# Patient Record
Sex: MALE | Race: WHITE | NOT HISPANIC OR LATINO | Employment: FULL TIME | URBAN - METROPOLITAN AREA
[De-identification: names, ages, dates, MRNs, and addresses within clinical notes are randomized per-mention and may not be internally consistent; named-entity substitution may affect disease eponyms.]

---

## 2023-08-14 ENCOUNTER — OFFICE VISIT (OUTPATIENT)
Dept: FAMILY MEDICINE | Facility: CLINIC | Age: 38
End: 2023-08-14
Payer: COMMERCIAL

## 2023-08-14 VITALS
HEIGHT: 73 IN | SYSTOLIC BLOOD PRESSURE: 120 MMHG | TEMPERATURE: 98.2 F | WEIGHT: 181 LBS | HEART RATE: 72 BPM | DIASTOLIC BLOOD PRESSURE: 75 MMHG | BODY MASS INDEX: 23.99 KG/M2 | OXYGEN SATURATION: 100 % | RESPIRATION RATE: 16 BRPM

## 2023-08-14 DIAGNOSIS — E05.90 HYPERTHYROIDISM: ICD-10-CM

## 2023-08-14 DIAGNOSIS — F33.2 SEVERE EPISODE OF RECURRENT MAJOR DEPRESSIVE DISORDER, WITHOUT PSYCHOTIC FEATURES (H): Primary | ICD-10-CM

## 2023-08-14 PROCEDURE — 84443 ASSAY THYROID STIM HORMONE: CPT | Performed by: STUDENT IN AN ORGANIZED HEALTH CARE EDUCATION/TRAINING PROGRAM

## 2023-08-14 PROCEDURE — 99204 OFFICE O/P NEW MOD 45 MIN: CPT | Performed by: STUDENT IN AN ORGANIZED HEALTH CARE EDUCATION/TRAINING PROGRAM

## 2023-08-14 PROCEDURE — 36415 COLL VENOUS BLD VENIPUNCTURE: CPT | Performed by: STUDENT IN AN ORGANIZED HEALTH CARE EDUCATION/TRAINING PROGRAM

## 2023-08-14 RX ORDER — LAMOTRIGINE 100 MG/1
250 TABLET ORAL DAILY
COMMUNITY
End: 2023-09-26

## 2023-08-14 RX ORDER — METHIMAZOLE 5 MG/1
5 TABLET ORAL DAILY
COMMUNITY
End: 2024-04-02

## 2023-08-14 ASSESSMENT — PATIENT HEALTH QUESTIONNAIRE - PHQ9
SUM OF ALL RESPONSES TO PHQ QUESTIONS 1-9: 15
SUM OF ALL RESPONSES TO PHQ QUESTIONS 1-9: 15
10. IF YOU CHECKED OFF ANY PROBLEMS, HOW DIFFICULT HAVE THESE PROBLEMS MADE IT FOR YOU TO DO YOUR WORK, TAKE CARE OF THINGS AT HOME, OR GET ALONG WITH OTHER PEOPLE: VERY DIFFICULT

## 2023-08-14 ASSESSMENT — COLUMBIA-SUICIDE SEVERITY RATING SCALE - C-SSRS
6. HAVE YOU EVER DONE ANYTHING, STARTED TO DO ANYTHING, OR PREPARED TO DO ANYTHING TO END YOUR LIFE?: NO
2. IN THE PAST MONTH, HAVE YOU ACTUALLY HAD ANY THOUGHTS OF KILLING YOURSELF?: NO
1. WITHIN THE PAST MONTH, HAVE YOU WISHED YOU WERE DEAD OR WISHED YOU COULD GO TO SLEEP AND NOT WAKE UP?: YES

## 2023-08-14 ASSESSMENT — ANXIETY QUESTIONNAIRES
2. NOT BEING ABLE TO STOP OR CONTROL WORRYING: NEARLY EVERY DAY
6. BECOMING EASILY ANNOYED OR IRRITABLE: NEARLY EVERY DAY
IF YOU CHECKED OFF ANY PROBLEMS ON THIS QUESTIONNAIRE, HOW DIFFICULT HAVE THESE PROBLEMS MADE IT FOR YOU TO DO YOUR WORK, TAKE CARE OF THINGS AT HOME, OR GET ALONG WITH OTHER PEOPLE: VERY DIFFICULT
4. TROUBLE RELAXING: NEARLY EVERY DAY
GAD7 TOTAL SCORE: 20
5. BEING SO RESTLESS THAT IT IS HARD TO SIT STILL: NEARLY EVERY DAY
3. WORRYING TOO MUCH ABOUT DIFFERENT THINGS: NEARLY EVERY DAY
1. FEELING NERVOUS, ANXIOUS, OR ON EDGE: MORE THAN HALF THE DAYS
7. FEELING AFRAID AS IF SOMETHING AWFUL MIGHT HAPPEN: NEARLY EVERY DAY
GAD7 TOTAL SCORE: 20

## 2023-08-14 NOTE — PROGRESS NOTES
Assessment & Plan     Severe episode of recurrent major depressive disorder, without psychotic features (H)  - continue lamotrigine for now. Briefly discussed potential medications which could be added, will await records because patient reports he had adverse responses to a number of medications.   - NELIDA for his previous provider in [place] ([facility])   - Adult Mental Health  Referral for medication consult   - patient will call his insurance for options for therapists      Hyperthyroidism  Reports last labs were checked in January 2023 and were normal  - TSH with free T4 reflex  - continue methimazole      Depression Screening Follow Up        8/14/2023     2:23 PM   PHQ   PHQ-9 Total Score 15   Q9: Thoughts of better off dead/self-harm past 2 weeks Several days   F/U: Thoughts of suicide or self-harm No   F/U: Safety concerns No         8/14/2023     2:23 PM   Last PHQ-9   1.  Little interest or pleasure in doing things 2   2.  Feeling down, depressed, or hopeless 2   3.  Trouble falling or staying asleep, or sleeping too much 1   4.  Feeling tired or having little energy 1   5.  Poor appetite or overeating 2   6.  Feeling bad about yourself 3   7.  Trouble concentrating 2   8.  Moving slowly or restless 1   Q9: Thoughts of better off dead/self-harm past 2 weeks 1   PHQ-9 Total Score 15   In the past two weeks have you had thoughts of suicide or self harm? No   Do you have concerns about your personal safety or the safety of others? No             8/14/2023     3:08 PM   [facility]-SSRS ([facility] [place])   Within the last month, have you wished you were dead or wished you could go to sleep and not wake up? Yes   Within the last month, have you had any actual thoughts of killing yourself? No   Within the last month, have you ever done anything, started to do anything, or prepared to do anything to end your life? No       Follow Up        Follow Up Actions Taken  Crisis resource information provided in  the After Visit Summary  Mental Health Referral placed      Joi Schumacher PA-C  [facility] GHASSAN Chavez is a 37 year old, presenting for the following health issues:  Establish Care        8/14/2023     2:32 PM   Additional Questions   Roomed by Julia COSTA       History of Present Illness       Mental Health Follow-up:  Patient presents to follow-up on Depression & Anxiety.Patient's depression since last visit has been:  No change  The patient is having other symptoms associated with depression.  Patient's anxiety since last visit has been:  No change  The patient is having other symptoms associated with anxiety.  Any significant life events: job concerns, financial concerns, grief or loss and health concerns  Patient is feeling anxious or having panic attacks.  Patient has concerns about alcohol or drug use.    Hypertension: He presents for follow up of hypertension.  He does not check blood pressure  regularly outside of the clinic. Outside blood pressures have been over 140/90. He does not follow a low salt diet.     Hypothyroidism:     Since last visit, patient describes the following symptoms::  Anxiety, Depression and Fatigue    Reason for visit:  Establish primary care/psychiatric & psychological care/discuss alternative medicines & treatments    He eats 0-1 servings of fruits and vegetables daily.He consumes 0 sweetened beverage(s) daily.He exercises with enough effort to increase his heart rate 20 to 29 minutes per day.  He exercises with enough effort to increase his heart rate 3 or less days per week.   He is taking medications regularly.     {Provider Documentation  Link to [facility]-SSRS ([facility]) Flowsheet :92359      Abnormal Mood Symptoms  Onset: Off and on since age 15  Description:   Depression: YES  Anxiety: YES  Accompanying Signs & Symptoms:  Still participating in activities that you used to enjoy: No  Fatigue: Yes  Irritability: YES  Difficulty concentrating:  "YES  Changes in appetite: no  Problems with sleep: no  Heart racing/beating fast : no  Thoughts of hurting yourself or others: none  History:   Recent stress: YES  Prior depression hospitalization: Yes in early 20s   Family history of depression: YES  Family history of anxiety: YES  Precipitating factors:   Alcohol/drug use: no  Alleviating factors:  Sometimes edibles help that have thc or cbd    Therapies Tried and outcome: lamotrigine 250 mg daily with some benefit - started in 2015    First diagnosed with MDD at age 15. Has previously tried multiple different antidepressant medications (does not recall all of them, but remembers prozac was the first one) and had side effects and/or worsening symptoms. Feels current medication is working OK, but interested in a change. Recently moved from  [place] in June, and would like to  be referred for therapy and psychiatry services.     Review of Systems   As noted above in HPI.        Objective    /75 (BP Location: Right arm, Patient Position: Sitting, Cuff Size: Adult Large)   Pulse 72   Temp 98.2  F (36.8  C) (Oral)   Resp 16   Ht 1.854 m (6' 1\")   Wt 82.1 kg (181 lb)   SpO2 100%   BMI 23.88 kg/m    Body mass index is 23.88 kg/m .  Physical Exam   GENERAL: healthy, alert and no distress  NECK: no adenopathy, no asymmetry, masses, or scars and thyroid normal to palpation  RESP: lungs clear to auscultation - no rales, rhonchi or wheezes  CV: regular rate and rhythm, normal S1 S2, no S3 or S4, no murmur, click or rub, no peripheral edema and peripheral pulses strong  ABDOMEN: soft, nontender, no hepatosplenomegaly, no masses and bowel sounds normal  MS: no gross musculoskeletal defects noted, no edema    Results for orders placed or performed in visit on 08/14/23   TSH with free T4 reflex     Status: Normal   Result Value Ref Range    TSH 2.03 0.30 - 4.20 uIU/mL                   "

## 2023-08-15 LAB — TSH SERPL DL<=0.005 MIU/L-ACNC: 2.03 UIU/ML (ref 0.3–4.2)

## 2023-08-30 ENCOUNTER — MYC MEDICAL ADVICE (OUTPATIENT)
Dept: FAMILY MEDICINE | Facility: CLINIC | Age: 38
End: 2023-08-30
Payer: COMMERCIAL

## 2023-09-19 ASSESSMENT — ANXIETY QUESTIONNAIRES
GAD7 TOTAL SCORE: 19
4. TROUBLE RELAXING: NEARLY EVERY DAY
7. FEELING AFRAID AS IF SOMETHING AWFUL MIGHT HAPPEN: MORE THAN HALF THE DAYS
3. WORRYING TOO MUCH ABOUT DIFFERENT THINGS: NEARLY EVERY DAY
GAD7 TOTAL SCORE: 19
GAD7 TOTAL SCORE: 19
2. NOT BEING ABLE TO STOP OR CONTROL WORRYING: NEARLY EVERY DAY
6. BECOMING EASILY ANNOYED OR IRRITABLE: NEARLY EVERY DAY
1. FEELING NERVOUS, ANXIOUS, OR ON EDGE: MORE THAN HALF THE DAYS
3. WORRYING TOO MUCH ABOUT DIFFERENT THINGS: NEARLY EVERY DAY
1. FEELING NERVOUS, ANXIOUS, OR ON EDGE: MORE THAN HALF THE DAYS
2. NOT BEING ABLE TO STOP OR CONTROL WORRYING: NEARLY EVERY DAY
7. FEELING AFRAID AS IF SOMETHING AWFUL MIGHT HAPPEN: MORE THAN HALF THE DAYS
4. TROUBLE RELAXING: NEARLY EVERY DAY
5. BEING SO RESTLESS THAT IT IS HARD TO SIT STILL: NEARLY EVERY DAY
5. BEING SO RESTLESS THAT IT IS HARD TO SIT STILL: NEARLY EVERY DAY
6. BECOMING EASILY ANNOYED OR IRRITABLE: NEARLY EVERY DAY
GAD7 TOTAL SCORE: 19
IF YOU CHECKED OFF ANY PROBLEMS ON THIS QUESTIONNAIRE, HOW DIFFICULT HAVE THESE PROBLEMS MADE IT FOR YOU TO DO YOUR WORK, TAKE CARE OF THINGS AT HOME, OR GET ALONG WITH OTHER PEOPLE: VERY DIFFICULT
IF YOU CHECKED OFF ANY PROBLEMS ON THIS QUESTIONNAIRE, HOW DIFFICULT HAVE THESE PROBLEMS MADE IT FOR YOU TO DO YOUR WORK, TAKE CARE OF THINGS AT HOME, OR GET ALONG WITH OTHER PEOPLE: VERY DIFFICULT

## 2023-09-20 NOTE — PROGRESS NOTES
[facility] Psychiatry Services - [place]      PATIENT'S NAME: Scott Lake  PREFERRED NAME: Scott  PRONOUNS:       MRN: [id number]  : 1985  ADDRESS: [address] [place] [id number]  ACCT. NUMBER:  [id number]  DATE OF SERVICE: 23  START TIME: 330pm  END TIME: 352pm  PREFERRED PHONE: [phone]  May we leave a program related message: Yes  SERVICE MODALITY:  Video Visit:      Provider verified identity through the following two step process.  Patient provided:  Patient  and Patient address    Telemedicine Visit: The patient's condition can be safely assessed and treated via synchronous audio and visual telemedicine encounter.      Reason for Telemedicine Visit: Services only offered telehealth    Originating Site (Patient Location): Patient's home    Distant Site (Provider Location): [facility]    Consent:  The patient/guardian has verbally consented to: the potential risks and benefits of telemedicine (video visit) versus in person care; bill my insurance or make self-payment for services provided; and responsibility for payment of non-covered services. First appointment with patient in [facility] and was advised of the short-term, team based structure of the model including role of [facility] and provider. Patient indicated understanding of the model and agreed to proceed with services as described. Care team has reviewed attendance agreement with patient. Patient advised that two failed appointments within 6 months may lead to termination of current episode of care.       Patient would like the video invitation sent by:  My Chart    Mode of Communication:  Video Conference via Amwell    Distant Location (Provider):  On-site    As the provider I attest to compliance with applicable laws and regulations related to telemedicine.    UNIVERSAL ADULT Mental Health DIAGNOSTIC ASSESSMENT    Identifying Information:  Patient is a 37 year  "old,   individual.  Patient was referred for an assessment by self.  Patient attended the session alone.    Chief Complaint:   The reason for seeking services at this time is: \"Depression/Anxiety\". [facility] reviewed PHQ9. Denies intent, plan or safety concerns. \"Life would be easier not dealing with this everyday\".  The problem(s) began 01/19/02. Pt moved here June so he is wanting to establish psychiatry care.     Patient has attempted to resolve these concerns in the past through medication and counseling . [facility] offered to make referral for counseling. Pt receptive. [facility] offered to bridge but pt would like to wait and start with regular therapist. [facility] informed pt that he could use our  previous My Chart messages if he changes his mind and would like to schedule.      Most important: med consult, lamictal is working well and he'd prefer to stay on it for now,  relationship between physical pain and depression?    Social/Family History:  Patient reported they grew up in other [facility].  They were raised by biological parents  .  Parents were always together. Pt has one siblings  Patient reported that their childhood was \"pretty normal, outdoorsy, played sports, supported by parents\".  Patient described their current relationships with family of origin as \"pretty good, strong\" .     The patient describes their cultural background as .  Cultural influences and impact on patient's life structure, values, norms, and healthcare: NA.  Contextual influences on patient's health include: none noted.    These factors will be addressed in the Preliminary Treatment plan. Patient identified their preferred language to be English. Patient reported they does not need the assistance of an  or other support involved in therapy.     Patient reported had no significant delays in developmental tasks.   Patient's highest education level was graduate school  .  Patient identified the following learning " problems: none reported.  Modifications will not be used to assist communication in therapy.  Patient reports they are  able to understand written materials.    Patient reported the following relationship history never .  Patient's current relationship status is has a partner or significant other for 6 years.   Patient identified their sexual orientation as heterosexual.  Patient reported having 1 child(tonny). Patient identified partner; parents; mother; father; pets as part of their support system.  Patient identified the quality of these relationships as stable and meaningful,  .      Patient's current living/housing situation involves staying in own home/apartment.  The immediate members of family and household include Ulisses Pham,Partner  and they report that housing is stable.    Patient is currently employed fulltime.  Patient reports their finances are obtained through employment. Patient does identify finances as a current stressor.      Patient reported that they have not been involved with the legal system.    . Patient does not report being under probation/ parole/ jurisdiction. They are not under any current court jurisdiction. .    Patient's Strengths and Limitations:  Patient identified the following strengths or resources that will help them succeed in treatment: friends / good social support, family support, insight, intelligence, and motivation. Things that may interfere with the patient's success in treatment include: none identified.     Assessments:  The following assessments were completed by patient for this visit:  PHQ2:       8/14/2023     2:23 PM   PHQ-2 ( 1999 [employer])   Q1: Little interest or pleasure in doing things 2   Q2: Feeling down, depressed or hopeless 3   PHQ-2 Score 5   Q1: Little interest or pleasure in doing things More than half the days   Q2: Feeling down, depressed or hopeless Nearly every day   PHQ-2 Score 5     PHQ9:       8/14/2023     2:23 PM 9/26/2023     6:47  AM   PHQ-9 SCORE   PHQ-9 Total Score MyChart 15 (Moderately severe depression) 14 (Moderate depression)   PHQ-9 Total Score 15 14    14     GAD2:       9/19/2023    10:00 AM   CARLENE-2   Feeling nervous, anxious, or on edge 3    3   Not being able to stop or control worrying 3    3   CARLENE-2 Total Score 6    6     GAD7:       8/14/2023     2:32 PM 9/19/2023    10:00 AM   CARLENE-7 SCORE   Total Score 20 (severe anxiety) 19 (severe anxiety)   Total Score 20 19    19     CAGE-AID:       9/19/2023    10:02 AM   CAGE-AID Total Score   Total Score 2    2   Total Score MyChart 2 (A total score of 2 or greater is considered clinically significant)     PROMIS 10-Global Health (all questions and answers displayed):       9/19/2023    10:01 AM   PROMIS 10   In general, would you say your health is: Fair   In general, would you say your quality of life is: Good   In general, how would you rate your physical health? Good   In general, how would you rate your mental health, including your mood and your ability to think? Fair   In general, how would you rate your satisfaction with your social activities and relationships? Good   In general, please rate how well you carry out your usual social activities and roles Good   To what extent are you able to carry out your everyday physical activities such as walking, climbing stairs, carrying groceries, or moving a chair? Completely   In the past 7 days, how often have you been bothered by emotional problems such as feeling anxious, depressed, or irritable? Often   In the past 7 days, how would you rate your fatigue on average? Moderate   In the past 7 days, how would you rate your pain on average, where 0 means no pain, and 10 means worst imaginable pain? 6   In general, would you say your health is: 2    2   In general, would you say your quality of life is: 3    3   In general, how would you rate your physical health? 3    3   In general, how would you rate your mental health, including your  "mood and your ability to think? 2    2   In general, how would you rate your satisfaction with your social activities and relationships? 3    3   In general, please rate how well you carry out your usual social activities and roles. (This includes activities at home, at work and in your community, and responsibilities as a parent, child, spouse, employee, friend, etc.) 3    3   To what extent are you able to carry out your everyday physical activities such as walking, climbing stairs, carrying groceries, or moving a chair? 5    5   In the past 7 days, how often have you been bothered by emotional problems such as feeling anxious, depressed, or irritable? 4    4   In the past 7 days, how would you rate your fatigue on average? 3    3   In the past 7 days, how would you rate your pain on average, where 0 means no pain, and 10 means worst imaginable pain? 6    6   Global Mental Health Score 10    10   Global Physical Health Score 14    14   PROMIS TOTAL - SUBSCORES 24    24     PROMIS 10-Global Health (only subscores and total score):       9/19/2023    10:01 AM   PROMIS-10 Scores Only   Global Mental Health Score 10    10   Global Physical Health Score 14    14   PROMIS TOTAL - SUBSCORES 24    24     Desha Suicide Severity Rating Scale (Lifetime/Recent)      9/27/2023     9:02 AM   Desha Suicide Severity Rating (Lifetime/Recent)   Q1 Wish to be Dead (Lifetime) Y   Wish to be Dead Description (Lifetime) \"Life would be easier not dealing with this everyday\"   1. Wish to be Dead (Past 1 Month) Y   Wish to be Dead Description (Past 1 Month) \"Life would be easier not dealing with this everyday\"   Q2 Non-Specific Active Suicidal Thoughts (Lifetime) Y   Non-Specific Active Suicidal Thought Description (Lifetime) \"Life would be easier not dealing with this everyday\"   2. Non-Specific Active Suicidal Thoughts (Past 1 Month) Y   Non-Specific Active Suicidal Thought Description (Past 1 Month) \"Life would be easier not " "dealing with this everyday\"   3. Active Suicidal Ideation with any Methods (Not Plan) Without Intent to Act (Lifetime) Y   Active Suicidal Ideation with any Methods (Not Plan) Description (Lifetime) Pt has hx of over dose in his early 20's   Q3 Active Suicidal Ideation with any Methods (Not Plan) Without Intent to Act (Past 1 Month) N   Q4 Active Suicidal Ideation with Some Intent to Act, Without Specific Plan (Lifetime) Y   Active Suicidal Ideation with Some Intent to Act, Without Specific Plan Description (Lifetime) Pt has hx of over dose in his early 20's   4. Active Suicidal Ideation with Some Intent to Act, Without Specific Plan (Past 1 Month) N   Q5 Active Suicidal Ideation with Specific Plan and Intent (Lifetime) Y   Active Suicidal Ideation with Specific Plan and Intent Description (Lifetime) Pt has hx of over dose in his early 20's   5. Active Suicidal Ideation with Specific Plan and Intent (Past 1 Month) N   Most Severe Ideation Rating (Lifetime) 5   Description of Most Severe Ideation (Past 1 Month) NA   Frequency (Lifetime) 3   Duration (Lifetime) 2   Controllability (Lifetime) 2   Deterrents (Lifetime) 3   Deterrents (Past 1 Month) 0   Reasons for Ideation (Lifetime) 4   Reasons for Ideation (Past 1 Month) 0   Actual Attempt (Lifetime) Y   Total Number of Actual Attempts (Lifetime) 1   Actual Attempt Description (Lifetime) Pt has hx of over dose in his early 20's   Actual Attempt (Past 3 Months) N   Has subject engaged in non-suicidal self-injurious behavior? (Lifetime) N   Interrupted Attempts (Lifetime) N   Aborted or Self-Interrupted Attempt (Lifetime) N   Preparatory Acts or Behavior (Lifetime) Y   Total Number of Preparatory Acts (Lifetime) 1   Preparatory Acts or Behavior Description (Lifetime) Gathered medication   Preparatory Acts or Behavior (Past 3 Months) N   Most Recent Attempt Date 6/8/2008   Actual Lethality/Medical Damage Code (Most Recent Attempt) 1   Potential Lethality Code (Most " Recent Attempt) 0   Most Lethal Attempt Date 6/8/2008   Actual Lethality/Medical Damage Code (Most Lethal Attempt) 0   Potential Lethality Code (Most Lethal Attempt) 0   Initial/First Attempt Date 6/8/2008   Actual Lethality/Medical Damage Code (Initial/First Attempt) 0   Potential Lethality Code (Initial/First Attempt) 0   Calculated C-SSRS Risk Score (Lifetime/Recent) Moderate Risk       Personal and Family Medical History:  Patient does report a family history of mental health concerns.  Patient reports family history includes Alcoholism in his paternal grandfather; Anxiety Disorder in his sister; Depression in his paternal grandmother and sister..     Patient does report Mental Health Diagnosis and/or Treatment.  Patient Patient reported the following previous diagnoses which include(s): an anxiety disorder; depression .  Patient reported symptoms began 2002.  Patient has received mental health services in the past:  therapy; psychiatry  .  Psychiatric Hospitalizations: none when yes, in his 20's follow an overdose  ,  ,  ,  ,  ,  ,  ,  ,  ,  ,  .  Patient denies a history of civil commitment.  Currently, patient none  receiving other mental health services.  These include none.       Patient has had a physical exam to rule out medical causes for current symptoms.  Date of last physical exam was within the past year. Client was encouraged to follow up with PCP if symptoms were to develop. The patient has a [facility] Primary Care Provider, who is named No Ref-Primary, Physician..  Patient reports no current medical concerns.  Patient denies any issues with pain..   There are not significant appetite / nutritional concerns / weight changes.   Patient does report a history of head injury / trauma / cognitive impairment.  Concussion in 2014, effects for 3-4 months    Patient reports current meds as:   Outpatient Medications Marked as Taking for the 9/26/23 encounter (Virtual Visit) with Ewa Campos LICSW    Medication Sig    methimazole (TAPAZOLE) 5 MG tablet Take 5 mg by mouth daily    [DISCONTINUED] lamoTRIgine (LAMICTAL) 100 MG tablet Take 250 mg by mouth daily       Medication Adherence:  Patient reports taking.  .    Patient Allergies:  No Known Allergies    Medical History:  No past medical history on file.      Current Mental Status Exam:   Appearance:  Appropriate    Eye Contact:  Good   Psychomotor:  Normal       Gait / station:  no problem  Attitude / Demeanor: Cooperative   Speech      Rate / Production: Normal/ Responsive      Volume:  Normal  volume      Language:  intact  Mood:   Normal  Affect:   Appropriate    Thought Content: Clear   Thought Process: Coherent  Logical       Associations: No loosening of associations  Insight:   Good   Judgment:  Intact   Orientation:  All  Attention/concentration: Good    Substance Use:  Patient did not report a family history of substance use concerns; see medical history section for details.  Patient has not received chemical dependency treatment in the past.  Patient has not ever been to detox.      Patient is not currently receiving any chemical dependency treatment.           Substance History of use Age of first use Date of last use     Pattern and duration of use (include amounts and frequency)   Alcohol currently use   19 09/18/23 2-3 beers 4 or 5 nights per week   Cannabis   currently use 36 09/17/23 3-5x week for sleep     Amphetamines   never used     REPORTS SUBSTANCE USE: N/A   Cocaine/crack    never used       REPORTS SUBSTANCE USE: N/A   Hallucinogens never used         REPORTS SUBSTANCE USE: N/A   Inhalants never used         REPORTS SUBSTANCE USE: N/A   Heroin never used         REPORTS SUBSTANCE USE: N/A   Other Opiates never used     REPORTS SUBSTANCE USE: N/A   Benzodiazepine   never used     REPORTS SUBSTANCE USE: N/A   Barbiturates never used     REPORTS SUBSTANCE USE: N/A   Over the counter meds never used     REPORTS SUBSTANCE USE: N/A    Caffeine currently use 17   unremarkable   Nicotine  used in the past 18 05/19/13 REPORTS SUBSTANCE USE: N/A   Other substances not listed above:  Identify:  never used     REPORTS SUBSTANCE USE: N/A     Patient reported the following problems as a result of their substance use: no problems, not applicable.    Substance Use: No symptoms    Based on the positive CAGE score and clinical interview there: [facility] will continue to assess.    Significant Losses / Trauma / Abuse / Neglect Issues:   Patient did not  serve in the .  There are indications or report of significant loss, trauma, abuse or neglect issues related to: are no indications and client denies any losses, trauma, abuse, or neglect concerns.  Concerns for possible neglect are not present.     Safety Assessment:   Patient denies current homicidal ideation and behaviors.  Patient denies current self-injurious ideation and behaviors.    Patient denied risk behaviors associated with substance use.  Patient denies any high risk behaviors associated with mental health symptoms.  Patient reports the following current concerns for their personal safety: None.  Patient reports there are not firearms in the house.       .    History of Safety Concerns:  Patient denied a history of homicidal ideation.     Patient denied a history of personal safety concerns.    Patient denied a history of assaultive behaviors.    Patient denied a history of sexual assault behaviors.     Patient denied a history of risk behaviors associated with substance use.  Patient denies any history of high risk behaviors associated with mental health symptoms.  Patient reports the following protective factors: forward or future oriented thinking; dedication to family or friends; safe and stable environment; regular sleep; living with other people; daily obligations; effective problem solving skills; access to a variety of clinical interventions and pets    Risk Plan:  See Recommendations  for Safety and Risk Management Plan    Review of Symptoms per patient report:   Depression: Lack of interest, Excessive or inappropriate guilt, Difficulties concentrating, Suicidal ideation, Feelings of hopelessness, Ruminations, Irritability, Feeling sad, down, or depressed, Withdrawn, and interrupted sleep (sometimes takes THC gummy for sleep), low appetite, low energy, low motivation, + attempt in his early 20's by overdose he was not in ICU but did an inpt stay.  Sylvie:  No Symptoms  Psychosis: No Symptoms  Anxiety: Excessive worry, Nervousness, Physical complaints, such as headaches, stomachaches, muscle tension, Sleep disturbance, Ruminations, and Poor concentration  Panic:  Palpitations, Tremors, Shortness of breath, Tingling, and every few weeks  Post Traumatic Stress Disorder:  Experienced traumatic event accident resulted in facial fracture, other injuries as child, concussion 2014 effects lasted 3-4 months    Eating Disorder: No Symptoms  ADD / ADHD:  Inattentive, Poor task completion, Poor organizational skills, and Distractibility. Denies any previous dx of ADHD  Conduct Disorder: No symptoms  Autism Spectrum Disorder: No symptoms  Obsessive Compulsive Disorder: No Symptoms    Patient reports the following compulsive behaviors and treatment history:  none .      Diagnostic Criteria:   A. Excessive anxiety and worry about a number of events or activities (such as work or school performance).   B. The person finds it difficult to control the worry.  C. Select 3 or more symptoms (required for diagnosis). Only one item is required in children.   - Restlessness or feeling keyed up or on edge.    - Difficulty concentrating or mind going blank.    - Irritability.    - Sleep disturbance (difficulty falling or staying asleep, or restless unsatisfying sleep).   D. The focus of the anxiety and worry is not confined to features of an Axis I disorder.  E. The anxiety, worry, or physical symptoms cause clinically  significant distress or impairment in social, occupational, or other important areas of functioning.   F. The disturbance is not due to the direct physiological effects of a substance (e.g., a drug of abuse, a medication) or a general medical condition (e.g., hyperthyroidism) and does not occur exclusively during a Mood Disorder, a Psychotic Disorder, or a Pervasive Developmental Disorder. Major Depressive Disorder  CRITERIA (A-C) REPRESENT A MAJOR DEPRESSIVE EPISODE - SELECT THESE CRITERIA  A) Recurrent episode(s) - symptoms have been present during the same 2-week period and represent a change from previous functioning 5 or more symptoms (required for diagnosis)   - Depressed mood. Note: In children and adolescents, can be irritable mood.     - Decreased sleep.    - Fatigue or loss of energy.    - Diminished ability to think or concentrate, or indecisiveness.    - Recurrent thoughts of death (not just fear of dying), recurrent suicidal ideation without a specific plan, or a suicide attempt or a specific plan for committing suicide.   B) The symptoms cause clinically significant distress or impairment in social, occupational, or other important areas of functioning  C) The episode is not attributable to the physiological effects of a substance or to another medical condition  D) The occurence of major depressive episode is not better explained by other thought / psychotic disorders  E) There has never been a manic episode or hypomanic episode    Functional Status:  Patient reports the following functional impairments:  management of the household and or completion of tasks, organization, relationship(s), social interactions, and work / vocational responsibilities.     Nonprogrammatic care:  Patient is requesting basic services to address current mental health concerns.    Clinical Summary:  1. Reason for assessment: depression,anxiety, attention  .  2. Psychosocial, Cultural and Contextual Factors: none noted   .  3. Principal DSM5 Diagnoses  (Sustained by DSM5 Criteria Listed Above):   296.32 (F33.1) Major Depressive Disorder, Recurrent Episode, Moderate _ and With anxious distress.  4. Other Diagnoses that is relevant to services:   300.02 (F41.1) Generalized Anxiety Disorder.  5. Provisional Diagnosis:  296.32 (F33.1) Major Depressive Disorder, Recurrent Episode, Moderate _ and With anxious distress  300.02 (F41.1) Generalized Anxiety Disorder as evidenced by ROS, CARLENE, PHQ, chart review and the interview.  .  6. Prognosis: Relieve Acute Symptoms.  7. Likely consequences of symptoms if not treated: Worsening symptoms and diminishing ability to function.  .  8. Client strengths include:  caring, educated, employed, goal-focused, good listener, insightful, intelligent, motivated, responsible parent, and support of family, friends and providers .     Recommendations:     1. Plan for Safety and Risk Management:   Safety and Risk: A safety and risk management plan has been developed including: Patient consented to co-developed safety plan.  Safety and risk management plan was completed - see below.  Patient agreed to use safety plan should any safety concerns arise.  A copy was given to the patient..          Report to child / adult protection services was NA.     2. Patient's identified mental health concerns with a cultural influence will be addressed by at the request of the pt .     3. Initial Treatment will focus on:    Depressed Mood - moderate  Anxiety - CARLENE .     4. Resources/Service Plan:    services are not indicated.   Modifications to assist communication are not indicated.   Additional disability accommodations are not indicated.      5. Collaboration:   Collaboration / coordination of treatment will be initiated with the following  support professionals: Collaborated with [facility] team .      6.  Referrals:   The following referral(s) will be initiated:  NA . Next Scheduled Appointment: LOI BALLESTEROS  Release of Information has been obtained for the following:  NA .     Clinical Substantiation/medical necessity for the above recommendations:  see assessment.    7. MERCY:    MERCY:   no problem use noted . Provider gave patient printed information about the  effects of chemical use on their health and well being. Recommendations:  NA .     8. Records:   These were reviewed at time of assessment.   Information in this assessment was obtained from the medical record and  provided by patient who is a good historian.    Patient will have open access to their mental health medical record.    9.   Interactive Complexity: No    10. 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 His SO has consistent employment     Additional People, Places, and Things that I can access for support: friends         What is important to me and makes life worth living: family, friends .         GREEN    Good Control  1. I feel good  2. No suicidal thoughts   3. Can work, sleep and play      Action Steps  1. Self-care: balanced meals, exercising, sleep practices, etc.  2. Take your medications as prescribed.  3. Continue meetings with therapist and prescriber.  4.  Do the healthy things that I enjoy.             YELLOW  Getting Worse  I have ANY of these:  1. I do not feel good  2. Difficulty Concentrating  3. Sleep is changing  4. Increase/Change in my thoughts to hurt self and/or  others, but I can still manage and not act on it.   5. Not taking care of self.               Action Steps (in addition to the above):  1. Inform your therapist and psychiatric prescriber/PCP.  2. Keep taking your medications as prescribed.    3. Turn to people you can ask for help.  4. Use internal coping strategies -see below.  5. Create safe environment: lock and limit medications and notify friends/family of increase in symptoms             RED  Get Help  If I have ANY of these:  1. Current and uncontrollable thoughts and/or behaviors to hurt self and/or others.      Actions to manage my safety  1. Contact your emergency person [address])  2. Call or Text [phone]  3. Call my crisis team- [facility] [phone]  3. Or Call 911 or go to the emergency room right away  4. See attached crisis resources        My Internal Coping Strategies include the following:  Spend time with loved ones    [End for Brief Safety Plan]     Safety Concerns  How To Identify Situations That Make Your Mental Health Worse:  Triggers are things that make your mental health worse.  Look at the list below to help you find your triggers and what you can do about them.     1. Identify Early Warning Signs:    Sometimes symptoms return, even when people do their best to stay well. Symptoms can develop over a short period of time with little or no warning, but most of the time they emerge gradually over several weeks.  Early warning signs are changes that people experience when a relapse is starting. Some early warning signs are common and others are not as common.   Common Early Warning Signs:    Trouble sleeping -either too much or too little sleep, Feeling depressed or low, Feeling like not being around other people, and Urges to harm self     2. Identify action steps to take when warning signs are noticed:    Taking Action- It is important to take action if you are experiencing early warning signs of a relapse.  The faster you act, the more  likely it is that you can avoid a full relapse.  It is helpful to identify several specific ways to cope with symptoms.      The following is my list of symptoms and coping strategies that I can use when they are present:    Symptom Coping Strategies   Anxiety -Talk with someone in your support system and let him or her know how you are feeling.  -Use relaxation techniques such as deep breathing or imagery.  -Use positive affirmations to counteract negative self-talk such as  I am learning to let go of worry.    Depression - Schedule your day; include activities you have to do and activities you enjoy doing.  - Get some exercise - walk, run, bike, or swim.  - Give yourself credit for even the smallest things you get done.   Sleep Difficulties   - Go to sleep at the same time every day.  - Do something relaxing before bed, such as drinking herbal tea or listening to music.  - Avoid having discussions about upsetting topics before going to bed.   Delusions   - Distract yourself from the disturbing thought by doing something that requires your attention such as a puzzle.  - Check out your beliefs by talking to someone you trust.    Hallucinations   - Use headphones to listen to music.  - Tell voices to  stop  or say to yourself,  I am safe.   - Ignore the hallucinations as much as possible; focus on other things.   Concentration Difficulties - Minimize distractions so there is only one thing for you to focus on at a time.    - Ask the person you are having a conversation with to slow down or repeat things you are unsure of.             Provider Name/ Credentials:  Ewa Campos [facility]  She/her   September 26, 2023    Crisis Resources    Refer to the resources below as needed.    Steps to care for yourself    If you are currently in counseling, call your counselor for an appointment  Call the local crisis resources below if needed.  Contact friends or family for support.  Get more exercise.  Do activities you  enjoy.  Eat a well-balanced diet and drink plenty of fluids.  Rest as needed.  Limit alcohol and recreational drugs. These can worsen depression.  Properly store or remove fire arms.     When to contact your primary care provider     You have thoughts of harming or killing yourself but have not made a plan to carry it out.  Your depression gets in the way of daily activities.  You are often unable to sleep.  You need help cutting back on alcohol or recreational drugs.    When to call 911 or go to the Emergency Room     Get emergency help right away if you have any of the following:  You are planning to harm or kill yourself and you have a way to carry out the plan.   You have injured yourself or others. Or, you think you will.  You feel confused or are having trouble thinking or remembering.  You are having delusions (false beliefs).  You are hearing voices or seeing things that aren t there.  You are feeling psychotic (paranoid, fearful, restless, agitated, nervous, racing thoughts or speech)    Crisis Resources   The [facility] is an adults only unit located at [facility] is a short term (generally less than 23 hour stay) designed for crisis intervention and stabilization. Pts have the opportunity to meet quickly with a behavioral health team for evaluation in a calm and peaceful therapuetic environment. To be evaluated for admission pts are triaged throught the [facility] ED.     The [facility] Center (B[facility]) is located at the [facility].The BEC is open to ages and provides a comprehensive behavioral health evaluation to those in crisis. Patients typically stay 24-36 hours.     The following hotlines are for both adults and children. The and are open 24 hours a day, 7 days a week unless noted otherwise.      Crisis Lines      Crisis Text Line  Text 716753  You will be connected with a trained live crisis counselor to provide support.        Gambling  Hotline  7.682.769.9943 [hope]        línea de crisis española  053.344.8835        [facility]  [phone]        National Hope Line  5.539.507.0814 [hope]        National Suicide Prevention Lifeline  Free and confidential support  988 or 1.800.545.TALK [8255]  http://suicidepreventionlifeline.org        [employer] (LGBTQ Youth Crisis Line)  2.004.591.8212  text START to 137-678        's Crisis Line  4.024.253.9386 (Press 1)  or text 090187    [facility] Mental Health Crisis Response  Within [place], call **CRISIS [**590846] to be connected to a mental health professional who can assist you.        [facility] Crisis  [phone]      [facility] Mobile Crisis  [phone]      [facility] Crisis  [phone]      [facility] Mobile Crisis  [phone] (adults)  [phone] (children)      [facility] Mobile Crisis  [phone] (adults)  [phone] (children)      [facility] Mobile Crisis  [phone]      [facility] Mobile Crisis  [phone]    Community Resources      Fast Tracker  Linking people to mental health and substance use disorder resources  fasttrackermn.org        [place] Mental Health Warmline  Peer to peer support  5 pm to 9 am 7 days/week  7.360.013.5779  https://mnwitw.org/arnaldo        [employer]ness (EL)  686.168.3010 or 1.888.EL.HELPS  https://namimn.org/        [facility] Urgent [facility] for Adult Mental Health  [facility] residents only   [address] [place] [address]  [phone]        [facility]  Free mental health counseling  https://walkin.org/  612.870.0565 X2    Mental Health Apps      Calm Harm  https://calmharm.co.uk/      My3  https://my3app.org/      [place] Safety Plan  https://www.mysafetyplan.org/

## 2023-09-26 ENCOUNTER — VIRTUAL VISIT (OUTPATIENT)
Dept: BEHAVIORAL HEALTH | Facility: CLINIC | Age: 38
End: 2023-09-26
Payer: COMMERCIAL

## 2023-09-26 ENCOUNTER — VIRTUAL VISIT (OUTPATIENT)
Dept: PSYCHIATRY | Facility: CLINIC | Age: 38
End: 2023-09-26
Attending: STUDENT IN AN ORGANIZED HEALTH CARE EDUCATION/TRAINING PROGRAM
Payer: COMMERCIAL

## 2023-09-26 DIAGNOSIS — F33.2 SEVERE EPISODE OF RECURRENT MAJOR DEPRESSIVE DISORDER, WITHOUT PSYCHOTIC FEATURES (H): ICD-10-CM

## 2023-09-26 DIAGNOSIS — F41.1 GENERALIZED ANXIETY DISORDER: ICD-10-CM

## 2023-09-26 DIAGNOSIS — F33.1 DEPRESSION, MAJOR, RECURRENT, MODERATE (H): Primary | ICD-10-CM

## 2023-09-26 PROCEDURE — 99205 OFFICE O/P NEW HI 60 MIN: CPT | Mod: VID | Performed by: NURSE PRACTITIONER

## 2023-09-26 PROCEDURE — 90791 PSYCH DIAGNOSTIC EVALUATION: CPT | Mod: 95 | Performed by: SOCIAL WORKER

## 2023-09-26 RX ORDER — LAMOTRIGINE 100 MG/1
250 TABLET ORAL DAILY
Qty: 225 TABLET | Refills: 0 | Status: SHIPPED | OUTPATIENT
Start: 2023-09-26 | End: 2023-12-26

## 2023-09-26 ASSESSMENT — PATIENT HEALTH QUESTIONNAIRE - PHQ9

## 2023-09-26 NOTE — NURSING NOTE
Is the patient currently in the state of [place]? YES    Visit mode:VIDEO    If the visit is dropped, the patient can be reconnected by: VIDEO VISIT: Text to cell phone:   Telephone Information:   Mobile [phone]       Will anyone else be joining the visit? NO  (If patient encounters technical issues they should call [phone])    How would you like to obtain your AVS? MyChart    Are changes needed to the allergy or medication list? Pt stated no changes to allergies and Pt stated no med changes    Reason for visit: Consult    Cyndi Carrero VVF         Care team has reviewed attendance agreement with patient. Patient advised that two failed appointments within 6 months may lead to termination of current episode of care.

## 2023-09-26 NOTE — PROGRESS NOTES
"Virtual Visit Details    Type of service:  Video Visit     Originating Location (pt. Location): Home    Distant Location (provider location):  Off-site  Platform used for Video Visit: Quixey        PSYCHIATRIC DIAGNOSTIC ASSESSMENT      Name:  Scott Lake  : 1985    Referred by: Joi Schumacher PA-[facility]  Patient Care Team:  Anushka Ref-Primary, Physician as PCP - Jennifer Espino as Personal Advocate & Liaison (PAL) (Family Medicine)  Therapist:       History was provided by patient who were good historian(s).    Patient attended the session alone    RECORDS AVAILABLE FOR REVIEW: EHR records through [employer] .  In addition, reviewed the assessment today completed by Ewa Campos [facility], Behavioral Health Consultant                                                 CHIEF COMPLAINT   Patient is a 37 year old,  White Not  or  male  who presents for initial psychiatric evaluation. Referred by their Primary Care Provider: Physician Anushka Murphy-Primary to the [facility] ([facility]) for evaluation of First diagnosed with MDD at age 15. Has previously tried multiple different antidepressant medications (does not recall all of them, but remembers prozac was the first one) and had side effects and/or worsening symptoms. Feels current medication is working OK, but interested in a change. Recently moved from  [place] in , and would like to  be referred for therapy and psychiatry services. Our psychiatry providers act as a specialty service for Primary Care Providers in the [facility] who seek to optimize medications for unstable patients.  Once medications have been optimized, our providers discharge the patient back to the referring Primary Care Provider for ongoing medication management.  This type of system allows our providers to serve a high volume of patients.      Note by PCP day of referral:  \"First diagnosed with MDD at " "age 15. Has previously tried multiple different antidepressant medications (does not recall all of them, but remembers prozac was the first one) and had side effects and/or worsening symptoms. Feels current medication is working OK, but interested in a change. Recently moved from  [place] in June, and would like to  be referred for therapy and psychiatry services.    Severe episode of recurrent major depressive disorder, without psychotic features (H)  - continue lamotrigine for now. Briefly discussed potential medications which could be added, will await records because patient reports he had adverse responses to a number of medications.   - NELIDA for his previous provider in [place] ([facility]) \"       HISTORY OF PRESENT ILLNESS   Per [facility], Ewa Campos, during today's team-based visit:  \"The reason for seeking services at this time is: \"Depression/Anxiety\". [facility] reviewed PHQ9. Denies intent, plan or safety concerns. \"Life would be easier not dealing with this everyday\".  The problem(s) began 01/19/02. Pt moved here June so he is wanting to establish psychiatry care.  Patient has attempted to resolve these concerns in the past through medication and counseling . [facility] offered to make referral for counseling. Pt receptive. [facility] offered to bridge but pt would like to wait and start with regular therapist. [facility] informed pt that he could use our  previous My Chart messages if he changes his mind and would like to schedule.   Most important: med consult, lamictal is working well and he'd prefer to stay on it for now,  relationship between physical pain and depression?\"    Reports past diagnosis of Major Depressive Disorder.  In addition endorses chronic pain in knees, foot and wrists.  Reports moved from [place], [place] to [place] in June.  Reports no prior diagnosis.  Primary concern is depression and reports currently 7-8/10 (ten point worsening scale).  Typically it is more neutralized.  Impacting his job, his " relationship and parenting.          PSYCHIATRIC HISTORY:   Previous psychiatry: psychiatry with multiple providers in many states  Previous therapist: historically  History of Psychiatric Hospitalizations:   - Inpatient:  denies   - IOP/PHP/Day treatment:  denies   History of Suicidal Ideation:  Endorses passive SI, no intent or plan.   History of Suicide Attempts:  denies     History of Self-injurious Behavior: denies   History of Violence/Aggression: denies    History of Commitment?  Denies    Electroconvulsive Therapy (ECT) or Transcranial Magnetic Stimulation (TMS): denies    PharmacogenomicTesting (such as GeneSight): denies      PSYCHIATRIC REVIEW OF SYSTEMS:   Sleep: not great.  Long period of time he is over thinking, worrying and nonrestorative          Depression: Lack of interest, Excessive or inappropriate guilt, Difficulties concentrating, Suicidal ideation, Feelings of hopelessness, Ruminations, Irritability, Feeling sad, down, or depressed, Withdrawn, and interrupted sleep (sometimes takes THC gummy for sleep), low appetite, low energy, low motivation, + attempt in his early 20's by overdose he was not in ICU but did an inpt stay.  Sylvie:  No Symptoms  Psychosis: No Symptoms  Anxiety: Excessive worry, Nervousness, Physical complaints, such as headaches, stomachaches, muscle tension, Sleep disturbance, Ruminations, and Poor concentration  Panic:  Palpitations, Tremors, Shortness of breath, Tingling, and every few weeks  Post Traumatic Stress Disorder:  Experienced traumatic event accident resulted in facial fracture, other injuries as child, concussion 2014 effects lasted 3-4 months    Eating Disorder: No Symptoms  ADD / ADHD:  Inattentive, Poor task completion, Poor organizational skills, and Distractibility. Denies any previous dx of ADHD  Conduct Disorder: No symptoms  Autism Spectrum Disorder: No symptoms  Obsessive Compulsive Disorder: No Symptoms  Patient reports the following compulsive  behaviors and treatment history:  none .     ASSESSMENT SCALES:      8/14/2023     2:23 PM 9/26/2023     6:47 AM   PHQ-9 SCORE   PHQ-9 Total Score MyChart 15 (Moderately severe depression) 14 (Moderate depression)   PHQ-9 Total Score 15 14    14           9/26/2023     6:47 AM   Last PHQ-9   1.  Little interest or pleasure in doing things 2    2   2.  Feeling down, depressed, or hopeless 2    2   3.  Trouble falling or staying asleep, or sleeping too much 1    1   4.  Feeling tired or having little energy 2    2   5.  Poor appetite or overeating 2    2   6.  Feeling bad about yourself 2    2   7.  Trouble concentrating 1    1   8.  Moving slowly or restless 1    1   Q9: Thoughts of better off dead/self-harm past 2 weeks 1    1   PHQ-9 Total Score 14    14   In the past two weeks have you had thoughts of suicide or self harm? No    No   Do you have concerns about your personal safety or the safety of others? No    No     PHQ9 score is 14 indicating moderate depression.  Suicidal ideation:  Denies        8/14/2023     2:32 PM 9/19/2023    10:00 AM   CARLENE-7 SCORE   Total Score 20 (severe anxiety) 19 (severe anxiety)   Total Score 20 19    19         8/14/2023     2:32 PM 9/19/2023    10:00 AM   CARLENE-7   [employer], 2002; Used with Permission)   1. Feeling nervous, anxious, or on edge More than half the days More than half the days   2. Not being able to stop or control worrying Nearly every day Nearly every day   3. Worrying too much about different things Nearly every day Nearly every day   4. Trouble relaxing Nearly every day Nearly every day   5. Being so restless that it is hard to sit still Nearly every day Nearly every day   6. Becoming easily annoyed or irritable Nearly every day Nearly every day   7. Feeling afraid, as if something awful might happen Nearly every day More than half the days   CARLENE 7 TOTAL SCORE 20 (severe anxiety) 19 (severe anxiety)   1. Feeling nervous, anxious, or on edge 2 2    2   2. Not  being able to stop or control worrying 3 3    3   3. Worrying too much about different things 3 3    3   4. Trouble relaxing 3 3    3   5. Being so restless that it is hard to sit still 3 3    3   6. Becoming easily annoyed or irritable 3 3    3   7. Feeling afraid, as if something awful might happen 3 2    2   CARLENE-7 Total Score 20 19    19   If you checked any problems, how difficult have they made it for you to do your work, take care of things at home, or get along with other people? Very difficult Very difficult    Very difficult     GAD7 score is  is 19 indicating severe anxiety.        FAMILY, MEDICAL, SURGICAL HISTORY REVIEWED.  MEDICATION HAVE BEEN REVIEWED AND ARE CURRENT TO THE BEST OF MY KNOWLEDGE AND ABILITY.  In a relationship  Working full time     MEDICATIONS                                                                                                Current Outpatient Medications   Medication Sig    lamoTRIgine (LAMICTAL) 100 MG tablet Take 250 mg by mouth daily    methimazole (TAPAZOLE) 5 MG tablet Take 5 mg by mouth daily     No current facility-administered medications for this visit.       [place] Prescription Monitoring Program evaluating controlled substances in the last year in [place]:  none noted         NOTES ABOUT CURRENT PSYCHOTROPIC MEDICATIONS:   Lamotrigine 250 mg, on for about 7 years for depression.      SUPPLEMENTS: denies   SIDE EFFECTS:    denies    COMPLIANCE:   states Adherent to medication regimen      PAST PSYCHOTROPIC MEDICATIONS:  Fluoxetine   Cymbalta  Venlafaxine     Awaiting records from Dr. Miguel Arz[place], [place]     PSYCHOTROPIC DRUG INTERACTIONS                                           none    MANAGEMENT:  routine monitoring    VITALS   There were no vitals taken for this visit.     BP Readings from Last 1 Encounters:   08/14/23 120/75     Pulse Readings from Last 1 Encounters:   08/14/23 72     Wt Readings from Last 1 Encounters:   08/14/23 82.1 kg (181 lb)     Ht Readings  "from Last 1 Encounters:   08/14/23 1.854 m (6' 1\")     Estimated body mass index is 23.88 kg/m  as calculated from the following:    Height as of 8/14/23: 1.854 m (6' 1\").    Weight as of 8/14/23: 82.1 kg (181 lb).      PERTINENT HISTORY     There is no problem list on file for this patient.       Seizures or Head Injury: Denies history of seizures.  He had a severe concussion in 2004 which had lasting effects for 3-4 months afterward     No past surgical history on file.     SOCIAL HISTORY  Patient reported they grew up in other [facility].  They were raised by biological parents  .  Parents were always together. Pt has one siblings  Patient reported that their childhood was \"pretty normal, outdoorsy, played sports, supported by parents\".  Patient described their current relationships with family of origin as \"pretty good, strong\" .        Relationship status: significant other  Children: one  Highest education level was graduate school.    Service: denies    Employment status: full time     Trauma history: Denies  ACES (Adverse Childhood Experiences):  None.  Grew up in an intact home with all basic needs being met       LEGAL:  Denies      SUBSTANCE USE HISTORY  Social History     Tobacco Use    Smoking status: Never    Smokeless tobacco: Never   Substance Use Topics    Alcohol use: Not on file       Caffeine:  unremarkable   Alcohol:  2-3 beers 4 or 5 nights per week  Other substance use: cannabis 3-5x week for sleep  Past use alcohol/substance use: denies      Chemical dependency history: Patient has not received chemical dependency treatment in the past       Family History   Problem Relation Age of Onset    Anxiety Disorder Sister     Depression Sister     Alcoholism Paternal Grandfather     Depression Paternal Grandmother             PERTINENT FAMILY PSYCHIATRIC HISTORY NOTES  Pt reports that PGF had alcoholism, PGM had depression. His sister has depression and anxiety       Based on the " clinical interview, there  are not indications of drug or alcohol abuse. Continue to monitor.     LABS & IMAGING                                                                                                                     Recent Labs   Lab Test 23  1540   TSH 2.03     ALLERGY & IMMUNIZATIONS     No Known Allergies        MEDICAL REVIEW OF SYSTEMS:   Ten system review was completed with pertinent positives noted above    MENTAL STATUS EXAM:   General/Constitutional:  Appearance:   awake, alert, adequately groomed, appeared stated age and no apparent distress  Attitude:    cooperative   Eye Contact:  good  Musculoskeletal:  Psychomotor Behavior:  no evidence of tardive dyskinesia, dystonia, or tics from the head up  Psychiatric:  Speech:  clear, coherent, regular rate, rhythm, and volume,   No pressure speech noted.  Associations:  no loose associations  Thought Process:   logical, linear and goal oriented  Thought Content:    chronic suicidal idation, longstanding, no intent or plan. No  homicidal ideation, no evidence of psychotic thought, no auditory hallucinations present and no visual hallucinations present  Mood:   overall ok per his report  Affect:  full range/stable (normal variation of emotions during exam) and was congruent to speech content.  Insight:  good  Judgment:  intact, adequate for safety  Impulse Control:  intact  Neurological:  Oriented to:  person, place, time, and situation  Attention Span and Concentration:  Able to attend to the interview      Language: intact     Recent and Remote Memory:  Intact to interview. Not formally assessed. No amnesia.    Fund of Knowledge: appropriate         SAFETY   Feels safe in home: YES     Suicidal ideation: Denies  History of suicide attempts:  No   Hx of impulsivity: No   Hope for the future: present    Hx of Command hallucinations or current psychosis: None endorsed    History of Self-injurious behaviors: denies    Family member  by  suicide:  not discussed      SAFETY ASSESSMENT:   Based on all available evidence including the factors cited above, overall Risk for harm is low and is appropriate for outpatient level of care.   Recommended that patient call 911 or go to the local ED should there be a change in any of these risk factors.        LANGUAGE OR COMMUNICATION BARRIERS   Are there language or communication issues or need for modification in treatment? NO     Are there ethnic, cultural or Christianity factors that may be relevant for therapy? NO      Client identified their preferred language to be fluent English in conversational context  Does the client need the assistance of an  or other support involved in therapy? NO       DSM 5 DIAGNOSIS:   296.32 (F33.1) Major Depressive Disorder, Recurrent Episode, Moderate _        MEDICAL COMORBIDITY IMPACTING CLINICAL PICTURE:  chronic pain.  Known issue that I take into account for their medical decisions       ASSESSMENT AND PLAN    Scott Lake is a 37 year old White Not  or  male presenting for psychiatric evaluation and medication management through the [facility].  Information is obtained from patient and available records.  Reports history of depression and chronic pain.   Denies prior psychiatric hospitalizations. Hx of suicidal ideation, no suicide attempts. No history of self-injurious behaviors. Genetically loaded for  depression and substance use.. Grew up in an intact home with all basic needs being met.           Problem List Items Addressed This Visit          Behavioral       Considered augmenting, patient would like to continue current dose of lamotrigine at this time.  Discussed potentially using the duloxetine         [facility] Psychiatry options:    [facility]al Health and Wellness    [email or web address]    Life Development Resources  [email or web address]    Healing  Connections  [email or web address]    [facility]  [email or web address]    [facility]  [email or web address]            Severe episode of recurrent major depressive disorder, without psychotic features (H)    Relevant Medications    lamoTRIgine (LAMICTAL) 100 MG tablet          CONSULTS/REFERRALS:   Continue therapy   Coordinate care with therapist as needed      MEDICAL:   None at this time  Coordinate care with PCP (No Ref-Primary, Physician) as needed  Follow up with primary care provider as planned or for acute medical concerns.    PSYCHOEDUCATION:  Medication side effects and alternatives reviewed. Health promotion activities recommended and reviewed today. All questions addressed. Education and counseling completed regarding risks and benefits of medications and psychotherapy options.  Consent provided by patient/guardian  Call the psychiatric nurse line with medication questions or concerns at [phone].  Heliatek may be used to communicate with your provider, but this is not intended to be used for emergencies.  LOVEThESIGN.gov is information for patients.  It is run by the [employer] of Medicine and it contains information about all disorders, diseases and all medications.      COMMUNITY RESOURCES:    CRISIS NUMBERS: Provided in AVS 9/26/2023  National Suicide Prevention Lifeline: 3-457-612-TALK (030-163-7018)  Q Design/resources for a list of additional resources (SOS)            [facility] - [phone]   Urgent Care Adult Mental Zcebux-[phone] mobile unit/ 24/7 crisis line  [facility] -[phone]   COPE 24/7 [facility] Mobile Team -[phone] (adults)/ [phone] (child)  Poison Control Center - 1-295.589.3058    OR  go to nearest ER  Crisis Text Line for any crisis 24/7 send this-   To: 515740   [facility] ([facility]) [facility]  [phone]  National Suicide Prevention  Lifeline: 513.174.7068 (TTY: 851.117.9672). Call anytime for help.  (www.suicidepreventionlifeline.org)  National [place] on Mental Illness (www.yolanda.org): 981.172.3521 or 537-658-3760.   Mental Health Association (www.mentalhealth.org): 678.257.7191 or 066-634-6528.  [place] Crisis Text Line: Text [place] to 375947  Suicide LifeLine Chat: Yowza.org/chat    ADMINISTRATIVE BILLING:   Level of Medical Decision Making:   - At least 1 chronic problem that is not stable  - Engaged in prescription drug management during visit (discussed any medication benefits, side effects, alternatives, etc.)  - Diagnosis or treatment significantly limited by social determinants of health           Patient Status:  Our psychiatry providers act as a specialty service for Primary Care Providers in the [facility] that seek to optimize medications for unstable patients.  Once medications have been optimized, our providers discharge the patient back to the referring Primary Care Provider for ongoing medication management.  This type of system allows our providers to serve a high volume of patients. At this time  Patient will continue to be seen for ongoing consultation and stabilization.    Signed:   Daphne Quinn, HARINDER, APRN, FMROMAINP-[facility] ([facility])   Chart documentation done in part with Dragon Voice Recognition software.  Although reviewed after completion, some word and grammatical errors may remain.

## 2023-09-27 ASSESSMENT — COLUMBIA-SUICIDE SEVERITY RATING SCALE - C-SSRS
LETHALITY/MEDICAL DAMAGE CODE MOST LETHAL ACTUAL ATTEMPT: NO PHYSICAL DAMAGE OR VERY MINOR PHYSICAL DAMAGE
ATTEMPT LIFETIME: YES
2. HAVE YOU ACTUALLY HAD ANY THOUGHTS OF KILLING YOURSELF?: YES
2. HAVE YOU ACTUALLY HAD ANY THOUGHTS OF KILLING YOURSELF?: YES
TOTAL  NUMBER OF ACTUAL ATTEMPTS LIFETIME: 1
6. HAVE YOU EVER DONE ANYTHING, STARTED TO DO ANYTHING, OR PREPARED TO DO ANYTHING TO END YOUR LIFE?: NO
MOST LETHAL DATE: 61155
REASONS FOR IDEATION LIFETIME: MOSTLY TO END OR STOP THE PAIN (YOU COULDN'T GO ON LIVING WITH THE PAIN OR HOW YOU WERE FEELING)
LETHALITY/MEDICAL DAMAGE CODE MOST LETHAL POTENTIAL ATTEMPT: BEHAVIOR NOT LIKELY TO RESULT IN INJURY
6. HAVE YOU EVER DONE ANYTHING, STARTED TO DO ANYTHING, OR PREPARED TO DO ANYTHING TO END YOUR LIFE?: GATHERED MEDICATION
FIRST ATTEMPT DATE: 61155
3. HAVE YOU BEEN THINKING ABOUT HOW YOU MIGHT KILL YOURSELF?: YES
4. HAVE YOU HAD THESE THOUGHTS AND HAD SOME INTENTION OF ACTING ON THEM?: NO
5. HAVE YOU STARTED TO WORK OUT OR WORKED OUT THE DETAILS OF HOW TO KILL YOURSELF? DO YOU INTEND TO CARRY OUT THIS PLAN?: YES
TOTAL  NUMBER OF PREPARATORY ACTS LIFETIME: 1
LETHALITY/MEDICAL DAMAGE CODE FIRST POTENTIAL ATTEMPT: BEHAVIOR NOT LIKELY TO RESULT IN INJURY
4. HAVE YOU HAD THESE THOUGHTS AND HAD SOME INTENTION OF ACTING ON THEM?: YES
LETHALITY/MEDICAL DAMAGE CODE MOST RECENT ACTUAL ATTEMPT: MINOR PHYSICAL DAMAGE
TOTAL  NUMBER OF INTERRUPTED ATTEMPTS LIFETIME: NO
ATTEMPT PAST THREE MONTHS: NO
LETHALITY/MEDICAL DAMAGE CODE MOST RECENT POTENTIAL ATTEMPT: BEHAVIOR NOT LIKELY TO RESULT IN INJURY
REASONS FOR IDEATION PAST MONTH: DOES NOT APPLY
TOTAL  NUMBER OF ABORTED OR SELF INTERRUPTED ATTEMPTS LIFETIME: NO
1. HAVE YOU WISHED YOU WERE DEAD OR WISHED YOU COULD GO TO SLEEP AND NOT WAKE UP?: YES
1. IN THE PAST MONTH, HAVE YOU WISHED YOU WERE DEAD OR WISHED YOU COULD GO TO SLEEP AND NOT WAKE UP?: YES
5. HAVE YOU STARTED TO WORK OUT OR WORKED OUT THE DETAILS OF HOW TO KILL YOURSELF? DO YOU INTEND TO CARRY OUT THIS PLAN?: NO
6. HAVE YOU EVER DONE ANYTHING, STARTED TO DO ANYTHING, OR PREPARED TO DO ANYTHING TO END YOUR LIFE?: YES
LETHALITY/MEDICAL DAMAGE CODE FIRST ACTUAL ATTEMPT: NO PHYSICAL DAMAGE OR VERY MINOR PHYSICAL DAMAGE
MOST RECENT DATE: 61155

## 2023-10-12 NOTE — PATIENT INSTRUCTIONS
**For crisis resources, please see the information at the end of this document**     Thank you for coming to the [facility] MENTAL HEALTH & ADDICTION [place] [facility].    TREATMENT PLAN:    MEDICATIONS:   - The current medical regimen is effective; continue present plan and medications.     -PSYCHOEDUCATION:      CONSULTS/REFERRALS:   Continue therapy     LABS/PROCEDURES:    Please call your [facility] clinic and ask for a lab only appointment at your earliest convenience.  If your results are reassuring or normal they will be mailed to you or sent through [email or web address] within 7 days. If the lab tests need quick action we will call you with the results. The phone number we will call with results is # [phone].      FOLLOW UP: Schedule an appointment with me in six weeks  or sooner as needed.  The intake team should be calling you to schedule.  If you dont hear from them, or they were unable to reach you, please call [phone] to schedule.  Follow up with primary care provider as planned or for acute medical concerns.  Call the psychiatric nurse line with medication questions or concerns at [phone].      Medication Refills:  If you need any refills please call your pharmacy and they will contact us. Our fax number for refills is [phone]. Please allow three business for refill processing. If you need to  your refill at a new pharmacy, please contact the new pharmacy directly. The new pharmacy will help you get your medications transferred.     [facility] Assistance [phone]  Financial Assistance [phone]  [facility] Billing [phone]  Central Billing Office, [facility]: [phone]  [facility] Billing [phone]  Medical Records [phone]  [facility] Patient Bill of Rights [email or web address]       MENTAL HEALTH CRISIS RESOURCES:  For a emergency help, please call 911 or go to the nearest Emergency Department.     Emergency  rx sent   Walk-In Options:   EmPATH Unit @ [facility] ([place]): [phone] - Specialized mental health emergency area designed to be calming  [facility] ([place]): [phone]  [facility] ([place]): [phone]  [facility] ([place]): [phone]    National Crisis Information: Call 988 Suicide and Crisis Lifeline  Crisis Text Line (free 24/7):  call **CRISIS (**932622) Crisis or use the texting option by texting 940726.   National Suicide Prevention Lifeline: Call 988  Poison Control Center: 3-628-331-3991  Trans Lifeline: 6-898-400-4478 - Hotline for transgender people of all ages  The [place] Project: 5-745-229-0729 - Hotline for LGBT youth   List of all [facility] resources:   https://[place].gov/dhs/people-we-serve/adults/health-care/mental-health/resources/crisis-contacts.jsp    For Non-Emergency Support:   Fast Tracker: Mental Health & Substance Use Disorder Resources -   https://www.fasttrackermn.org/       Again thank you for choosing [facility] MENTAL HEALTH & ADDICTION [place] [facility] and please let us know how we can best partner with you to improve you and your family's health.    You may be receiving a survey regarding this appointment. We would love to have your feedback, both positive and negative. The survey is done by an external company, so your answers are anonymous.

## 2023-10-22 ENCOUNTER — HEALTH MAINTENANCE LETTER (OUTPATIENT)
Age: 38
End: 2023-10-22

## 2024-01-10 ASSESSMENT — ENCOUNTER SYMPTOMS
SHORTNESS OF BREATH: 0
HEMATOCHEZIA: 0
FREQUENCY: 1
HEADACHES: 0
DIZZINESS: 0
HEMATURIA: 0
DIARRHEA: 0
PARESTHESIAS: 0
MYALGIAS: 1
DYSURIA: 0
SORE THROAT: 0
PALPITATIONS: 0
EYE PAIN: 0
JOINT SWELLING: 0
WEAKNESS: 0
CONSTIPATION: 0
ABDOMINAL PAIN: 0
HEARTBURN: 0
FEVER: 0
COUGH: 0
NAUSEA: 0
ARTHRALGIAS: 1
CHILLS: 0
NERVOUS/ANXIOUS: 0

## 2024-01-17 ENCOUNTER — OFFICE VISIT (OUTPATIENT)
Dept: INTERNAL MEDICINE | Facility: CLINIC | Age: 39
End: 2024-01-17
Payer: COMMERCIAL

## 2024-01-17 VITALS
SYSTOLIC BLOOD PRESSURE: 123 MMHG | RESPIRATION RATE: 18 BRPM | TEMPERATURE: 98 F | BODY MASS INDEX: 24.39 KG/M2 | WEIGHT: 184 LBS | HEART RATE: 74 BPM | OXYGEN SATURATION: 98 % | HEIGHT: 73 IN | DIASTOLIC BLOOD PRESSURE: 86 MMHG

## 2024-01-17 DIAGNOSIS — Z11.4 SCREENING FOR HIV (HUMAN IMMUNODEFICIENCY VIRUS): ICD-10-CM

## 2024-01-17 DIAGNOSIS — Z13.220 SCREENING FOR HYPERLIPIDEMIA: ICD-10-CM

## 2024-01-17 DIAGNOSIS — Z23 NEED FOR IMMUNIZATION AGAINST INFLUENZA: ICD-10-CM

## 2024-01-17 DIAGNOSIS — E05.90 HYPERTHYROIDISM: ICD-10-CM

## 2024-01-17 DIAGNOSIS — F33.2 SEVERE EPISODE OF RECURRENT MAJOR DEPRESSIVE DISORDER, WITHOUT PSYCHOTIC FEATURES (H): ICD-10-CM

## 2024-01-17 DIAGNOSIS — Z00.00 ANNUAL PHYSICAL EXAM: Primary | ICD-10-CM

## 2024-01-17 DIAGNOSIS — Z11.59 NEED FOR HEPATITIS C SCREENING TEST: ICD-10-CM

## 2024-01-17 DIAGNOSIS — Z23 NEED FOR COVID-19 VACCINE: ICD-10-CM

## 2024-01-17 LAB
BASOPHILS # BLD AUTO: 0 10E3/UL (ref 0–0.2)
BASOPHILS NFR BLD AUTO: 0 %
EOSINOPHIL # BLD AUTO: 0 10E3/UL (ref 0–0.7)
EOSINOPHIL NFR BLD AUTO: 1 %
ERYTHROCYTE [DISTWIDTH] IN BLOOD BY AUTOMATED COUNT: 11.7 % (ref 10–15)
HCT VFR BLD AUTO: 46.9 % (ref 40–53)
HGB BLD-MCNC: 16.3 G/DL (ref 13.3–17.7)
IMM GRANULOCYTES # BLD: 0 10E3/UL
IMM GRANULOCYTES NFR BLD: 0 %
LYMPHOCYTES # BLD AUTO: 1 10E3/UL (ref 0.8–5.3)
LYMPHOCYTES NFR BLD AUTO: 20 %
MCH RBC QN AUTO: 33.1 PG (ref 26.5–33)
MCHC RBC AUTO-ENTMCNC: 34.8 G/DL (ref 31.5–36.5)
MCV RBC AUTO: 95 FL (ref 78–100)
MONOCYTES # BLD AUTO: 0.4 10E3/UL (ref 0–1.3)
MONOCYTES NFR BLD AUTO: 9 %
NEUTROPHILS # BLD AUTO: 3.3 10E3/UL (ref 1.6–8.3)
NEUTROPHILS NFR BLD AUTO: 70 %
PLATELET # BLD AUTO: 184 10E3/UL (ref 150–450)
RBC # BLD AUTO: 4.93 10E6/UL (ref 4.4–5.9)
WBC # BLD AUTO: 4.7 10E3/UL (ref 4–11)

## 2024-01-17 PROCEDURE — 85025 COMPLETE CBC W/AUTO DIFF WBC: CPT | Performed by: INTERNAL MEDICINE

## 2024-01-17 PROCEDURE — 99395 PREV VISIT EST AGE 18-39: CPT | Mod: 25 | Performed by: INTERNAL MEDICINE

## 2024-01-17 PROCEDURE — 90471 IMMUNIZATION ADMIN: CPT | Performed by: INTERNAL MEDICINE

## 2024-01-17 PROCEDURE — 90686 IIV4 VACC NO PRSV 0.5 ML IM: CPT | Performed by: INTERNAL MEDICINE

## 2024-01-17 PROCEDURE — 36415 COLL VENOUS BLD VENIPUNCTURE: CPT | Performed by: INTERNAL MEDICINE

## 2024-01-17 PROCEDURE — 87389 HIV-1 AG W/HIV-1&-2 AB AG IA: CPT | Performed by: INTERNAL MEDICINE

## 2024-01-17 PROCEDURE — 80061 LIPID PANEL: CPT | Performed by: INTERNAL MEDICINE

## 2024-01-17 PROCEDURE — 80053 COMPREHEN METABOLIC PANEL: CPT | Performed by: INTERNAL MEDICINE

## 2024-01-17 PROCEDURE — 99213 OFFICE O/P EST LOW 20 MIN: CPT | Mod: 25 | Performed by: INTERNAL MEDICINE

## 2024-01-17 PROCEDURE — 84443 ASSAY THYROID STIM HORMONE: CPT | Performed by: INTERNAL MEDICINE

## 2024-01-17 PROCEDURE — 86803 HEPATITIS C AB TEST: CPT | Performed by: INTERNAL MEDICINE

## 2024-01-17 PROCEDURE — 91320 SARSCV2 VAC 30MCG TRS-SUC IM: CPT | Performed by: INTERNAL MEDICINE

## 2024-01-17 PROCEDURE — 90480 ADMN SARSCOV2 VAC 1/ONLY CMP: CPT | Performed by: INTERNAL MEDICINE

## 2024-01-17 ASSESSMENT — ENCOUNTER SYMPTOMS
FEVER: 0
DIZZINESS: 0
ABDOMINAL PAIN: 0
ARTHRALGIAS: 1
NAUSEA: 0
SORE THROAT: 0
HEADACHES: 0
DIARRHEA: 0
HEMATURIA: 0
CONSTIPATION: 0
SHORTNESS OF BREATH: 0
DYSURIA: 0
COUGH: 0
FREQUENCY: 1
JOINT SWELLING: 0
PALPITATIONS: 0
MYALGIAS: 1
NERVOUS/ANXIOUS: 0
CHILLS: 0
EYE PAIN: 0
WEAKNESS: 0

## 2024-01-17 ASSESSMENT — PATIENT HEALTH QUESTIONNAIRE - PHQ9
10. IF YOU CHECKED OFF ANY PROBLEMS, HOW DIFFICULT HAVE THESE PROBLEMS MADE IT FOR YOU TO DO YOUR WORK, TAKE CARE OF THINGS AT HOME, OR GET ALONG WITH OTHER PEOPLE: SOMEWHAT DIFFICULT
SUM OF ALL RESPONSES TO PHQ QUESTIONS 1-9: 20
SUM OF ALL RESPONSES TO PHQ QUESTIONS 1-9: 20

## 2024-01-17 NOTE — PROGRESS NOTES
Preventive Care Visit  [facility]  Marciano Spencer MD, Internal Medicine  Jan 17, 2024      SUBJECTIVE:   Scott is a 38 year old, presenting for the following:  Physical      Patient is a 38-year-old  male who presents to the clinic for his annual physical.  Patient has no acute concerns or complaints today.  He reports stable appetite.  Patient is stooling voiding without issue.  He is sleeping well at night.  Patient is fasting for lab work today.  He does have a history of depression, and he has previously been seen by psychiatry.  He currently takes lamotrigine 250 mg daily for management of his mood.  Patient reports that this medication has been helpful.  He also has a history of hypothyroidism, and he has been taking methimazole 5 mg daily for approximately 6 years.  Patient has had no other interventions for his thyroid issues.  He is not currently reporting any significant changes in weight, tremor, palpitations, excessive sweating, or change in bowel habits.  Patient would like to update his immunizations today.    Healthy Habits:     Getting at least 3 servings of Calcium per day:  Yes    Bi-annual eye exam:  NO    Dental care twice a year:  Yes    Sleep apnea or symptoms of sleep apnea:  None    Diet:  Regular (no restrictions) and Breakfast skipped    Frequency of exercise:  1 day/week    Duration of exercise:  Less than 15 minutes    Taking medications regularly:  Yes    Medication side effects:  None    Additional concerns today:  No      Have you ever done Advance Care Planning? (For example, a Health Directive, POLST, or a discussion with a medical provider or your loved ones about your wishes): No, advance care planning information given to patient to review.  Patient declined advance care planning discussion at this time.    Social History     Tobacco Use    Smoking status: Never    Smokeless tobacco: Never   Substance Use Topics    Alcohol use: Not on file  "          1/10/2024    10:09 AM   Alcohol Use   Prescreen: >3 drinks/day or >7 drinks/week? No       Last PSA: No results found for: \"PSA\"    Reviewed orders with patient. Reviewed health maintenance and updated orders accordingly - Yes  Lab work is in process    Reviewed and updated as needed this visit by clinical staff   Tobacco  Allergies  Meds              Reviewed and updated as needed this visit by Provider                    OBJECTIVE:   /86   Pulse 74   Temp 98  F (36.7  C)   Resp 18   Ht 1.854 m (6' 1\")   Wt 83.5 kg (184 lb)   SpO2 98%   BMI 24.28 kg/m     Estimated body mass index is 24.28 kg/m  as calculated from the following:    Height as of this encounter: 1.854 m (6' 1\").    Weight as of this encounter: 83.5 kg (184 lb).  Review of Systems   Constitutional:  Negative for chills and fever.   HENT:  Negative for congestion, ear pain, hearing loss and sore throat.    Eyes:  Negative for pain and visual disturbance.   Respiratory:  Negative for cough and shortness of breath.    Cardiovascular:  Negative for chest pain and palpitations.   Gastrointestinal:  Negative for abdominal pain, constipation, diarrhea and nausea.   Genitourinary:  Positive for frequency. Negative for dysuria, genital sores, hematuria, penile discharge and urgency.   Musculoskeletal:  Positive for arthralgias and myalgias. Negative for joint swelling.   Skin:  Negative for rash.   Neurological:  Negative for dizziness, weakness and headaches.   Psychiatric/Behavioral:  The patient is not nervous/anxious.        Physical Exam  Vitals reviewed.   HENT:      Head: Normocephalic and atraumatic.      Right Ear: Tympanic membrane, ear canal and external ear normal.      Left Ear: Tympanic membrane, ear canal and external ear normal.      Mouth/Throat:      Mouth: Mucous membranes are moist.      Pharynx: Oropharynx is clear.   Eyes:      Extraocular Movements: Extraocular movements intact.      Conjunctiva/sclera: " Conjunctivae normal.      Pupils: Pupils are equal, round, and reactive to light.   Cardiovascular:      Rate and Rhythm: Normal rate and regular rhythm.      Pulses: Normal pulses.      Heart sounds: Normal heart sounds.   Pulmonary:      Effort: Pulmonary effort is normal.      Breath sounds: Normal breath sounds.   Abdominal:      General: Bowel sounds are normal.      Palpations: Abdomen is soft.   Musculoskeletal:         General: Normal range of motion.   Skin:     General: Skin is warm and dry.      Capillary Refill: Capillary refill takes less than 2 seconds.   Neurological:      General: No focal deficit present.      Mental Status: He is alert and oriented to person, place, and time.   Psychiatric:         Attention and Perception: Attention and perception normal.         Mood and Affect: Mood and affect normal.      Comments: PHQ-9 score of 20.     Diagnostic testing: CMP, CBC, FLP, and TSH are pending.    ASSESSMENT/PLAN:   Annual physical exam  At this time, patient does have an unremarkable physical examination.  His blood pressure is noted to be at an acceptable level.  We did spend some time discussing appropriate dietary and lifestyle modifications to help keep his weight and blood pressure under good control.  Fasting labs are pending.  All health maintenance items are addressed.    Severe episode of recurrent major depressive disorder, without psychotic features (H)  Chronic condition.  Patient does have a PHQ-9 score of 20.  He has been seen by psychiatry.  Will continue his lamotrigine at 250 mg daily as currently prescribed.  Screening for HIV (human immunodeficiency virus)  - HIV Antigen Antibody Combo    Need for hepatitis C screening test  - Hepatitis C Screen Reflex to HCV RNA Quant and Genotype    Hyperthyroidism  At this time, patient is not reporting any symptoms suggestive of inappropriate treated hyperthyroidism.  He does have a follow-up TSH that is currently pending.  While we are  awaiting his results, he will continue his methimazole at 5 mg daily.  Side effects medication reviewed.  Further recommendations will be made once his labs are available for review.    Need for immunization against influenza  Influenza vaccination administered.    Need for COVID-19 vaccine  Pfizer booster administered.    Screening for hyperlipidemia  - Lipid panel reflex to direct LDL Fasting      Patient has been advised of split billing requirements and indicates understanding: Yes    Depression Screening Follow Up        1/17/2024     9:00 AM   PHQ   PHQ-9 Total Score 20   Q9: Thoughts of better off dead/self-harm past 2 weeks Several days   F/U: Thoughts of suicide or self-harm No   F/U: Safety concerns No       Counseling  Reviewed preventive health counseling, as reflected in patient instructions       Regular exercise       Healthy diet/nutrition       Immunizations  Vaccinated for: Covid-19 and Influenza          He reports that he has never smoked. He has never used smokeless tobacco.      Signed Electronically by: Marciano Spencer MD  Answers submitted by the patient for this visit:  Patient Health Questionnaire (Submitted on 1/17/2024)  If you checked off any problems, how difficult have these problems made it for you to do your work, take care of things at home, or get along with other people?: Somewhat difficult  PHQ9 TOTAL SCORE: 20  Annual Preventive Visit (Submitted on 1/10/2024)  Chief Complaint: Annual Exam:  Blood in stool: No  heartburn: No  peripheral edema: No  mood changes: Yes  Skin sensation changes: No  impotence: No

## 2024-01-18 LAB
ALBUMIN SERPL BCG-MCNC: 4.9 G/DL (ref 3.5–5.2)
ALP SERPL-CCNC: 85 U/L (ref 40–150)
ALT SERPL W P-5'-P-CCNC: 13 U/L (ref 0–70)
ANION GAP SERPL CALCULATED.3IONS-SCNC: 10 MMOL/L (ref 7–15)
AST SERPL W P-5'-P-CCNC: 18 U/L (ref 0–45)
BILIRUB SERPL-MCNC: 0.6 MG/DL
BUN SERPL-MCNC: 13.2 MG/DL (ref 6–20)
CALCIUM SERPL-MCNC: 9.9 MG/DL (ref 8.6–10)
CHLORIDE SERPL-SCNC: 102 MMOL/L (ref 98–107)
CHOLEST SERPL-MCNC: 153 MG/DL
CREAT SERPL-MCNC: 0.89 MG/DL (ref 0.67–1.17)
DEPRECATED HCO3 PLAS-SCNC: 27 MMOL/L (ref 22–29)
EGFRCR SERPLBLD CKD-EPI 2021: >90 ML/MIN/1.73M2
FASTING STATUS PATIENT QL REPORTED: YES
GLUCOSE SERPL-MCNC: 96 MG/DL (ref 70–99)
HCV AB SERPL QL IA: NONREACTIVE
HDLC SERPL-MCNC: 34 MG/DL
HIV 1+2 AB+HIV1 P24 AG SERPL QL IA: NONREACTIVE
LDLC SERPL CALC-MCNC: 96 MG/DL
NONHDLC SERPL-MCNC: 119 MG/DL
POTASSIUM SERPL-SCNC: 4.5 MMOL/L (ref 3.4–5.3)
PROT SERPL-MCNC: 7.3 G/DL (ref 6.4–8.3)
SODIUM SERPL-SCNC: 139 MMOL/L (ref 135–145)
TRIGL SERPL-MCNC: 114 MG/DL
TSH SERPL DL<=0.005 MIU/L-ACNC: 1.34 UIU/ML (ref 0.3–4.2)

## 2024-03-01 ENCOUNTER — MYC MEDICAL ADVICE (OUTPATIENT)
Dept: FAMILY MEDICINE | Facility: CLINIC | Age: 39
End: 2024-03-01
Payer: COMMERCIAL

## 2024-03-01 ENCOUNTER — PATIENT OUTREACH (OUTPATIENT)
Dept: FAMILY MEDICINE | Facility: CLINIC | Age: 39
End: 2024-03-01
Payer: COMMERCIAL

## 2024-03-01 NOTE — TELEPHONE ENCOUNTER
Patient Quality Outreach    Patient is due for the following:   Depression  -  PHQ-9 needed    Next Steps:   Patient was assigned appropriate questionnaire to complete    Type of outreach:    Sent GiveForward message.    Next Steps:  Reach out within 90 days via Phone.    Max number of attempts reached: No. Will try again in 90 days if patient still on fail list.    Questions for provider review:    None           Jennifer Escalona

## 2024-03-05 NOTE — TELEPHONE ENCOUNTER
Called Pt. He stated that he wanted to schedule visit with Psych not family practice. I found a pending referral on Pt's chart from [facility] and provided Pt with the Ph # to call to schedule visit. Pt stated he would give them a call. I advised Pt to call me back if he needed further assistance and provider my direct ph #.    Jennifer Escalona/EMT-B  [facility] [place]

## 2024-04-01 ENCOUNTER — MYC REFILL (OUTPATIENT)
Dept: PSYCHIATRY | Facility: CLINIC | Age: 39
End: 2024-04-01
Payer: COMMERCIAL

## 2024-04-01 DIAGNOSIS — F33.2 SEVERE EPISODE OF RECURRENT MAJOR DEPRESSIVE DISORDER, WITHOUT PSYCHOTIC FEATURES (H): ICD-10-CM

## 2024-04-01 NOTE — TELEPHONE ENCOUNTER
[facility] refill message from the pt -  lamotrigine (LAMICTAL) 100 MG tablet    Patient Comment: Reaching end of supply. [facility] messaged me stating that they are waiting for your approval. My other medication (methimazole) is running low as well. This particular prescription is not  refillable  on mychart       Refill Information -  Date of Last Office Visit: 9/26/2023  Date of Next Office Visit: nothing scheduled   No shows since last visit: none  Cancellations since last visit: none    Medication requested: lamotrigine (LAMICTAL) 100 MG tablet Date last ordered: 12/26/2023 Qty: 225 Refills: 0  Take 2.5 tablets (250 mg) by mouth daily     Medication requested: methimazole (TAPAZOLE) 5 MG tablet  - prescribed by PCP for thyroid issues     Review of [place] ?: n/a    Lapse in medication adherence greater than 5 days?: no  If yes, call patient and gather details: n/a  Medication refill request verified as identical to current order?: Yes  Result of Last DAM, VPA, Li+ Level, CBC, or Carbamazepine Level (at or since last visit):  various labs were completed on 1/17/2024 when the pt saw Dr. Marciano Spencer for his physical.     Last visit treatment plan:   ASSESSMENT AND PLAN    Scott Lake is a 37 year old White Not  or  male presenting for psychiatric evaluation and medication management through the [facility].  Information is obtained from patient and available records.  Reports history of depression and chronic pain.   Denies prior psychiatric hospitalizations. Hx of suicidal ideation, no suicide attempts. No history of self-injurious behaviors. Genetically loaded for  depression and substance use.. Grew up in an intact home with all basic needs being met.            Problem List Items Addressed This Visit                  Behavioral          Considered augmenting, patient would like to continue current dose of lamotrigine at this time.  Discussed potentially using the  duloxetine          Per AVS  FOLLOW UP: Schedule an appointment with me in six weeks  or sooner as needed.     []Medication refilled per  Medication Refill in Ambulatory Care  policy.  [x]Medication unable to be refilled by RN due to criteria not met as indicated below:    []Eligibility - not seen in the last year   [x]Supervision - no future appointment   [x]Compliance - no shows, cancellations or lapse in therapy   []Verification - order discrepancy   []Controlled medication   []Medication not included in policy   []90-day supply request   []Other    RN sent a MyC message to the pt asking if he was planning on following up with psychiatry or no?  And to inform him that psychiatry does not manage nor refill his methimazole (TAPAZOLE) 5 MG which is used for his hypothyroidism.    Sissy Verdin RN on 4/1/2024 at 1:30 PM

## 2024-04-02 DIAGNOSIS — E05.90 HYPERTHYROIDISM: Primary | ICD-10-CM

## 2024-04-02 RX ORDER — METHIMAZOLE 5 MG/1
5 TABLET ORAL DAILY
Qty: 30 TABLET | Refills: 1 | Status: SHIPPED | OUTPATIENT
Start: 2024-04-02 | End: 2024-05-03

## 2024-04-02 RX ORDER — LAMOTRIGINE 100 MG/1
250 TABLET ORAL DAILY
Qty: 75 TABLET | Refills: 0 | Status: SHIPPED | OUTPATIENT
Start: 2024-04-02 | End: 2024-05-07

## 2024-05-03 ENCOUNTER — MYC REFILL (OUTPATIENT)
Dept: PSYCHIATRY | Facility: CLINIC | Age: 39
End: 2024-05-03
Payer: COMMERCIAL

## 2024-05-03 ENCOUNTER — MYC REFILL (OUTPATIENT)
Dept: INTERNAL MEDICINE | Facility: CLINIC | Age: 39
End: 2024-05-03
Payer: COMMERCIAL

## 2024-05-03 DIAGNOSIS — E05.90 HYPERTHYROIDISM: ICD-10-CM

## 2024-05-03 DIAGNOSIS — F33.2 SEVERE EPISODE OF RECURRENT MAJOR DEPRESSIVE DISORDER, WITHOUT PSYCHOTIC FEATURES (H): ICD-10-CM

## 2024-05-03 RX ORDER — LAMOTRIGINE 100 MG/1
250 TABLET ORAL DAILY
Qty: 75 TABLET | Refills: 0 | OUTPATIENT
Start: 2024-05-03

## 2024-05-03 RX ORDER — METHIMAZOLE 5 MG/1
5 TABLET ORAL DAILY
Qty: 90 TABLET | Refills: 1 | Status: SHIPPED | OUTPATIENT
Start: 2024-05-03

## 2024-05-03 NOTE — TELEPHONE ENCOUNTER
Prescription approved per [facility] Refill Protocol.  Ann Steven, RN  [facility] Triage Nurse

## 2024-05-06 ENCOUNTER — MYC MEDICAL ADVICE (OUTPATIENT)
Dept: INTERNAL MEDICINE | Facility: CLINIC | Age: 39
End: 2024-05-06
Payer: COMMERCIAL

## 2024-05-06 DIAGNOSIS — F33.2 SEVERE EPISODE OF RECURRENT MAJOR DEPRESSIVE DISORDER, WITHOUT PSYCHOTIC FEATURES (H): ICD-10-CM

## 2024-05-06 NOTE — TELEPHONE ENCOUNTER
Sent Eyewitness Surveillance message to patient.    Migue Mills, Triage RN [place]  2:39 PM 5/6/2024

## 2024-05-06 NOTE — TELEPHONE ENCOUNTER
Dr. Spencer only saw patient once on 01/17/2024. No primary care provider on file.     Last psychiatry appointment 09/26/2023 via [facility].     Please see patient's mychart message below.      Please advise, thanks.    Migue Mills, Triage RN [place]  2:10 PM 5/6/2024

## 2024-05-06 NOTE — TELEPHONE ENCOUNTER
If he is on a stable dose of his Lamictal, I can assist with refills.  If he is requiring adjustments of that particular medication, he will need to continue seeing his psychiatrist.

## 2024-05-07 NOTE — TELEPHONE ENCOUNTER
Please see patient's mychart message below.      Please advise, thanks.    Migue Mills, Triage RN [place]  9:02 AM 5/7/2024

## 2024-05-08 RX ORDER — LAMOTRIGINE 100 MG/1
250 TABLET ORAL DAILY
Qty: 75 TABLET | Refills: 5 | Status: SHIPPED | OUTPATIENT
Start: 2024-05-08

## 2024-05-08 NOTE — TELEPHONE ENCOUNTER
Hipscant message sent to patient advising prescription sent to the pharmacy.    Rama Hicks RN  [facility]

## 2024-07-21 ENCOUNTER — MYC MEDICAL ADVICE (OUTPATIENT)
Dept: INTERNAL MEDICINE | Facility: CLINIC | Age: 39
End: 2024-07-21
Payer: COMMERCIAL

## 2024-07-21 DIAGNOSIS — M54.50 LUMBAR PAIN: Primary | ICD-10-CM

## 2024-07-22 NOTE — TELEPHONE ENCOUNTER
If he has not had any imaging done of his back, I would recommend that as the first step so that the specialist will have something to review.  If he is agreeable, I can place an x-ray order for him.

## 2024-07-29 NOTE — TELEPHONE ENCOUNTER
Sent Admeld message to patient.    Thank you,  Migue Mills, Triage RN [place]  7:47 AM 7/29/2024

## 2024-07-31 ENCOUNTER — ANCILLARY PROCEDURE (OUTPATIENT)
Dept: GENERAL RADIOLOGY | Facility: CLINIC | Age: 39
End: 2024-07-31
Attending: INTERNAL MEDICINE
Payer: COMMERCIAL

## 2024-07-31 DIAGNOSIS — M54.50 LUMBAR PAIN: ICD-10-CM

## 2024-07-31 PROCEDURE — 72100 X-RAY EXAM L-S SPINE 2/3 VWS: CPT | Mod: TC | Performed by: RADIOLOGY

## 2024-08-08 ENCOUNTER — OFFICE VISIT (OUTPATIENT)
Dept: PHYSICAL MEDICINE AND REHAB | Facility: CLINIC | Age: 39
End: 2024-08-08
Attending: INTERNAL MEDICINE
Payer: COMMERCIAL

## 2024-08-08 VITALS
HEART RATE: 75 BPM | DIASTOLIC BLOOD PRESSURE: 84 MMHG | SYSTOLIC BLOOD PRESSURE: 121 MMHG | RESPIRATION RATE: 16 BRPM | OXYGEN SATURATION: 98 %

## 2024-08-08 DIAGNOSIS — M54.50 LUMBAR PAIN: Primary | ICD-10-CM

## 2024-08-08 DIAGNOSIS — M79.18 MYALGIA, OTHER SITE: ICD-10-CM

## 2024-08-08 PROCEDURE — 20552 NJX 1/MLT TRIGGER POINT 1/2: CPT | Performed by: PHYSICAL MEDICINE & REHABILITATION

## 2024-08-08 PROCEDURE — 99204 OFFICE O/P NEW MOD 45 MIN: CPT | Mod: 25 | Performed by: PHYSICAL MEDICINE & REHABILITATION

## 2024-08-08 RX ORDER — BETAMETHASONE SODIUM PHOSPHATE AND BETAMETHASONE ACETATE 3; 3 MG/ML; MG/ML
1 INJECTION, SUSPENSION INTRA-ARTICULAR; INTRALESIONAL; INTRAMUSCULAR; SOFT TISSUE ONCE
OUTPATIENT
Start: 2024-08-08 | End: 2024-08-08

## 2024-08-08 RX ORDER — TIZANIDINE 2 MG/1
2 TABLET ORAL 3 TIMES DAILY
Qty: 90 TABLET | Refills: 0 | Status: SHIPPED | OUTPATIENT
Start: 2024-08-08

## 2024-08-08 RX ORDER — MELOXICAM 15 MG/1
15 TABLET ORAL DAILY
Qty: 30 TABLET | Refills: 0 | Status: SHIPPED | OUTPATIENT
Start: 2024-08-08

## 2024-08-08 RX ORDER — BUPIVACAINE HYDROCHLORIDE 5 MG/ML
4 INJECTION, SOLUTION EPIDURAL; INTRACAUDAL ONCE
OUTPATIENT
Start: 2024-08-08 | End: 2024-08-08

## 2024-08-08 NOTE — NURSING NOTE
Chief Complaint   Patient presents with    New Patient     Here for consult regarding lower back pain, confirmed with patient     Darshana Adrian

## 2024-08-08 NOTE — PROGRESS NOTES
CC: I am seeing this patient for low back pain for the past few months.     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  He has been having pain affecting the left lower back region that does not go below the knee.  He denies any numbness or tingling.  He had x-rays done on 7/31/2024 which showed no significant concerns.  He has been taking pain medications including Tylenol and Advil without significant improvement.  He has also had some chiropractic 2-3 times a week for the first few weeks again without improvement.  He would like to get some relief.    He does have history of hyperthyroidism and has been on methimazole.    No past surgical history on file.  No history of surgeries.    LUMBAR SPINE TWO TO THREE VIEWS  7/31/2024 8:12 AM      HISTORY: Lumbar pain.     COMPARISON: None.                                                                       IMPRESSION: Alignment of the lumbar spine is within normal limits. No  loss of vertebral body height. No significant degenerative endplate  changes or loss of intervertebral disc space.      GUTIERREZ VILLALBA MD        Family History       Problem (# of Occurrences) Relation (Name,Age of Onset)    Anxiety Disorder (1) Sister    Depression (2) Sister, Paternal Grandmother    Alcoholism (1) Paternal Grandfather             Social History     Socioeconomic History    Marital status: Single     Spouse name: Not on file    Number of children: Not on file    Years of education: Not on file    Highest education level: Not on file   Occupational History    Not on file   Tobacco Use    Smoking status: Never    Smokeless tobacco: Never   Vaping Use    Vaping status: Never Used   Substance and Sexual Activity    Alcohol use: Not on file    Drug use: Not on file    Sexual activity: Not on file   Other Topics Concern    Not on file   Social History Narrative    Not on file     Social Determinants of  Health     Financial Resource Strain: Low Risk  (1/10/2024)    Financial Resource Strain     Within the past 12 months, have you or your family members you live with been unable to get utilities (heat, electricity) when it was really needed?: No   Food Insecurity: Low Risk  (1/10/2024)    Food Insecurity     Within the past 12 months, did you worry that your food would run out before you got money to buy more?: No     Within the past 12 months, did the food you bought just not last and you didn t have money to get more?: No   Transportation Needs: Low Risk  (1/10/2024)    Transportation Needs     Within the past 12 months, has lack of transportation kept you from medical appointments, getting your medicines, non-medical meetings or appointments, work, or from getting things that you need?: No   Physical Activity: Not on file   Stress: Not on file   Social Connections: Not on file   Interpersonal Safety: Low Risk  (1/17/2024)    Interpersonal Safety     Do you feel physically and emotionally safe where you currently live?: Yes     Within the past 12 months, have you been hit, slapped, kicked or otherwise physically hurt by someone?: No     Within the past 12 months, have you been humiliated or emotionally abused in other ways by your partner or ex-partner?: No   Housing Stability: Low Risk  (1/10/2024)    Housing Stability     Do you have housing? : Yes     Are you worried about losing your housing?: No       Current Outpatient Medications   Medication Sig Dispense Refill    lamoTRIgine (LAMICTAL) 100 MG tablet Take 2.5 tablets (250 mg) by mouth daily No further refills until follow-up ernst established.  Last seen 9/2023 75 tablet 5    methimazole (TAPAZOLE) 5 MG tablet Take 1 tablet (5 mg) by mouth daily 90 tablet 1       /84 (BP Location: Right arm, Patient Position: Sitting, Cuff Size: Adult Large)   Pulse 75   Resp 16   SpO2 98%      On examination, patient is alert and cooperative.  Vitals are stable he  is afebrile.    HEENT is negative.  Extraocular movements are intact.  Face is symmetric.  Tongue is midline.  Neck is supple.  He is able to move his upper extremities functionally.  He is able to carry out straight leg raising test.  Examination of the hips were nontender.  [place] was negative.  In a prone position, he is definitely tender over the left lumbar paraspinal region.  This appears to be more localized to the quadratus lumborum muscle.  Underlying discomfort from the facet disc cannot however be ruled out.  There was no tenderness over the SI joints of the gluteal region.    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  I suspect he suffered a small bruise and or myalgia in the affected region resulting in significant pain that has not responded to conservative measures.  I would therefore recommend giving him a trigger point injection with Marcaine and Celestone.  I will also add an anti-inflammatory medication and a muscle relaxant if there are no contraindications with his current medications.  I would like to see him in follow-up in about 6 weeks time.    45 minutes for the evaluation management portion of the visit, exclusive of the procedure time, greater than 50% was for counseling above-mentioned issues.    Procedure note: With informed consent, after explaining the benefits and risk of the procedure, using ChloraPrep for skin prep, 1 cc of Celestone with 4 cc of 0.25% Marcaine, using 25-gauge needle, the left quadratus lumborum trigger was injected into areas with relief.  He was able to get up and move without difficulty and the previous tenderness that he had also improved nicely.    I will have him return for follow-up visit in 6 weeks time at the [place] location.    Aubrey Fulton MD

## 2024-08-08 NOTE — LETTER
8/8/2024       RE: Scott Lake  [address]  [facility] [address]     Dear Colleague,    Thank you for referring your patient, Scott Lake, to the [facility] PHYSICAL MEDICINE AND REHABILITATION CLINIC [place] at [facility]. Please see a copy of my visit note below.    CC: I am seeing this patient for low back pain for the past few months.     Patient moved from 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  He has been having pain affecting the left lower back region that does not go below the knee.  He denies any numbness or tingling.  He had x-rays done on 7/31/2024 which showed no significant concerns.  He has been taking pain medications including Tylenol and Advil without significant improvement.  He has also had some chiropractic 2-3 times a week for the first few weeks again without improvement.  He would like to get some relief.    He does have history of hyperthyroidism and has been on methimazole.    No past surgical history on file.  No history of surgeries.    LUMBAR SPINE TWO TO THREE VIEWS  7/31/2024 8:12 AM      HISTORY: Lumbar pain.     COMPARISON: None.                                                                       IMPRESSION: Alignment of the lumbar spine is within normal limits. No  loss of vertebral body height. No significant degenerative endplate  changes or loss of intervertebral disc space.      GUTIERREZ VILLALBA MD        Family History       Problem (# of Occurrences) Relation (Name,Age of Onset)    Anxiety Disorder (1) Sister    Depression (2) Sister, Paternal Grandmother    Alcoholism (1) Paternal Grandfather             Social History     Socioeconomic History     Marital status: Single     Spouse name: Not on file     Number of children: Not on file     Years of education: Not on file     Highest education level: Not on file   Occupational History      Not on file   Tobacco Use     Smoking status: Never     Smokeless tobacco: Never   Vaping Use     Vaping status: Never Used   Substance and Sexual Activity     Alcohol use: Not on file     Drug use: Not on file     Sexual activity: Not on file   Other Topics Concern     Not on file   Social History Narrative     Not on file     Social Determinants of Health     Financial Resource Strain: Low Risk  (1/10/2024)    Financial Resource Strain      Within the past 12 months, have you or your family members you live with been unable to get utilities (heat, electricity) when it was really needed?: No   Food Insecurity: Low Risk  (1/10/2024)    Food Insecurity      Within the past 12 months, did you worry that your food would run out before you got money to buy more?: No      Within the past 12 months, did the food you bought just not last and you didn t have money to get more?: No   Transportation Needs: Low Risk  (1/10/2024)    Transportation Needs      Within the past 12 months, has lack of transportation kept you from medical appointments, getting your medicines, non-medical meetings or appointments, work, or from getting things that you need?: No   Physical Activity: Not on file   Stress: Not on file   Social Connections: Not on file   Interpersonal Safety: Low Risk  (1/17/2024)    Interpersonal Safety      Do you feel physically and emotionally safe where you currently live?: Yes      Within the past 12 months, have you been hit, slapped, kicked or otherwise physically hurt by someone?: No      Within the past 12 months, have you been humiliated or emotionally abused in other ways by your partner or ex-partner?: No   Housing Stability: Low Risk  (1/10/2024)    Housing Stability      Do you have housing? : Yes      Are you worried about losing your housing?: No       Current Outpatient Medications   Medication Sig Dispense Refill     lamoTRIgine (LAMICTAL) 100 MG tablet Take 2.5 tablets (250 mg) by mouth daily No  further refills until follow-up ernst established.  Last seen 9/2023 75 tablet 5     methimazole (TAPAZOLE) 5 MG tablet Take 1 tablet (5 mg) by mouth daily 90 tablet 1       /84 (BP Location: Right arm, Patient Position: Sitting, Cuff Size: Adult Large)   Pulse 75   Resp 16   SpO2 98%      On examination, patient is alert and cooperative.  Vitals are stable he is afebrile.    HEENT is negative.  Extraocular movements are intact.  Face is symmetric.  Tongue is midline.  Neck is supple.  He is able to move his upper extremities functionally.  He is able to carry out straight leg raising test.  Examination of the hips were nontender.  Lincoln was negative.  In a prone position, he is definitely tender over the left lumbar paraspinal region.  This appears to be more localized to the quadratus lumborum muscle.  Underlying discomfort from the facet disc cannot however be ruled out.  There was no tenderness over the SI joints of the gluteal region.    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  I suspect he suffered a small bruise and or myalgia in the affected region resulting in significant pain that has not responded to conservative measures.  I would therefore recommend giving him a trigger point injection with Marcaine and Celestone.  I will also add an anti-inflammatory medication and a muscle relaxant if there are no contraindications with his current medications.  I would like to see him in follow-up in about 6 weeks time.    45 minutes for the evaluation management portion of the visit, exclusive of the procedure time, greater than 50% was for counseling above-mentioned issues.    Procedure note: With informed consent, after explaining the benefits and risk of the procedure, using ChloraPrep for skin prep, 1 cc of Celestone with 4 cc of 0.25% Marcaine, using 25-gauge needle, the left quadratus lumborum  trigger was injected into areas with relief.  He was able to get up and move without difficulty and the previous tenderness that he had also improved nicely.    I will have him return for [place] [facility] [place]    Aubrey Fulton MD       Again, thank you for allowing me to participate in the care of your patient.      Sincerely,    Aubrey Fulton MD

## 2024-10-08 DIAGNOSIS — E05.90 HYPERTHYROIDISM: ICD-10-CM

## 2024-10-09 RX ORDER — METHIMAZOLE 5 MG/1
5 TABLET ORAL DAILY
Qty: 90 TABLET | Refills: 0 | Status: SHIPPED | OUTPATIENT
Start: 2024-10-09

## 2024-10-23 ENCOUNTER — TELEPHONE (OUTPATIENT)
Dept: PHYSICAL MEDICINE AND REHAB | Facility: CLINIC | Age: 39
End: 2024-10-23
Payer: COMMERCIAL

## 2024-10-23 NOTE — TELEPHONE ENCOUNTER
Called and relayed provider message:    ----- Message -----  From: Aubrey Fulton MD  Sent: 10/23/2024  10:26 AM CDT  Subject: RE: requesting MRI                               Will review at follow-up and then order if appropriate.    Aubrey Fulton MD    Patient requested to see if able to see Dr. Fulton earlier than the November 21st appt, and stated he would go to [place] if earlier availability is there, as he wants to get this pain figured out that he has been struggling with since June.     Routed to clinic facilitator and advised they will reach out to him to assist with getting him rescheduled sooner at [place], as Dr. Fulton's [place] schedule does not have earlier availability than the date he is currently scheduled for right now.     Bernadine Boo, PETERSONN RN  RN Care Coordinator for Physical Medicine and Rehabilitation  [facility] [address] [address]  Office: [phone]  Fax: [phone]

## 2024-10-23 NOTE — TELEPHONE ENCOUNTER
[facility] Call Center    Phone Message    May a detailed message be left on voicemail: yes     Reason for Call: Other: Pt is calling and stating that he would like orders put in for an MRI. Please let Pt know in MyChart when done.     Action Taken: Message routed to:  [facility] PMR    Travel Screening: Not Applicable     Date of Service:

## 2024-10-23 NOTE — TELEPHONE ENCOUNTER
----- Message -----  From: Stanley Gamino  Sent: 10/23/2024  11:25 AM CDT  To: EDIL Valentin- Sounds good, he is rescheduled for 10/29 Monae,    Stanley Arroyo  ----- Message -----  From: Bernadine Boo RN  Sent: 10/23/2024  11:14 AM CDT  To: Stanley Gamino; *    ----- Message from Bernadine Boo RN sent at 10/23/2024 11:14 AM CDT -----  Wan Angel,     Can you please assist with getting patient rescheduled for a follow up appt from his 11/21/24 [facility] appt to an earlier appt at [place] with Dr. Fulton, as Dr. Fulton's [place] schedule does not have earlier availability than the date he is currently scheduled for right now?    Thank you,    Bernadine

## 2024-10-29 ENCOUNTER — OFFICE VISIT (OUTPATIENT)
Dept: PHYSICAL MEDICINE AND REHAB | Facility: CLINIC | Age: 39
End: 2024-10-29
Payer: COMMERCIAL

## 2024-10-29 VITALS — HEART RATE: 66 BPM | DIASTOLIC BLOOD PRESSURE: 80 MMHG | OXYGEN SATURATION: 97 % | SYSTOLIC BLOOD PRESSURE: 129 MMHG

## 2024-10-29 DIAGNOSIS — M53.3 DISORDER OF SACRUM: ICD-10-CM

## 2024-10-29 DIAGNOSIS — M47.817 LUMBOSACRAL SPONDYLOSIS WITHOUT MYELOPATHY: Primary | ICD-10-CM

## 2024-10-29 DIAGNOSIS — G89.29 OTHER CHRONIC PAIN: ICD-10-CM

## 2024-10-29 PROCEDURE — 99214 OFFICE O/P EST MOD 30 MIN: CPT | Performed by: PHYSICAL MEDICINE & REHABILITATION

## 2024-10-29 RX ORDER — LIDOCAINE 4 G/G
1 PATCH TOPICAL EVERY 24 HOURS
Qty: 30 PATCH | Refills: 1 | Status: SHIPPED | OUTPATIENT
Start: 2024-10-29

## 2024-10-29 NOTE — NURSING NOTE
Scott Lake is a 38 year old male who presents for:  Chief Complaint   Patient presents with    Follow Up     Follow up back pain, no improvement in symptoms       Initial Vitals: /80 (BP Location: Right arm, Patient Position: Supine, Cuff Size: Adult Regular)   Pulse 66   SpO2 97%   BP cuff size: regular      Indu Graff MA

## 2024-10-29 NOTE — LETTER
10/29/2024       RE: Scott Lake  [address]  [facility] [address]     Dear Colleague,    Thank you for referring your patient, Scott Lake, to the [facility] [place] at [facility]. Please see a copy of my visit note below.    CC: Chronic low back pain extending to the knee worse on the left side, here for follow-up.    Patient is a very pleasant 38-year-old gentleman who I saw on 8/8/2024.  At that time because of his back issues since July and noting tenderness over the quadratus lumborum on the left side, I gave him some trigger point injections.  I also started him on Mobic and tizanidine.  He has been taking them and does not find that they have been that helpful.  His pain has come back and as noted extends to his lower extremity on the left side up to the knee.     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  He has been having pain affecting the left lower back region that does not go below the knee.  He denies any numbness or tingling.  He had x-rays done on 7/31/2024 which showed no significant concerns.  He has been taking pain medications including Tylenol and Advil without significant improvement.  He has also had some chiropractic 2-3 times a week for the first few weeks again without improvement.  He would like to get some relief.     He does have history of hyperthyroidism and has been on methimazole.     Past Surgical History   No past surgical history on file.     No history of surgeries.    LUMBAR SPINE TWO TO THREE VIEWS  7/31/2024 8:12 AM      HISTORY: Lumbar pain.     COMPARISON: None.                                                                       IMPRESSION: Alignment of the lumbar spine is within normal limits. No  loss of vertebral body height. No significant degenerative endplate  changes or loss of intervertebral disc space.       GUTIERREZ VILLALBA MD         Family History            Problem (# of Occurrences) Relation (Name,Age of Onset)     Anxiety Disorder (1) Sister     Depression (2) Sister, Paternal Grandmother     Alcoholism (1) Paternal Grandfather                   Social History   Social History            Socioeconomic History     Marital status: Single       Spouse name: Not on file     Number of children: Not on file     Years of education: Not on file     Highest education level: Not on file   Occupational History     Not on file   Tobacco Use     Smoking status: Never     Smokeless tobacco: Never   Vaping Use     Vaping status: Never Used   Substance and Sexual Activity     Alcohol use: Not on file     Drug use: Not on file     Sexual activity: Not on file   Other Topics Concern     Not on file   Social History Narrative     Not on file      Social Determinants of Health           Financial Resource Strain: Low Risk  (1/10/2024)     Financial Resource Strain       Within the past 12 months, have you or your family members you live with been unable to get utilities (heat, electricity) when it was really needed?: No   Food Insecurity: Low Risk  (1/10/2024)     Food Insecurity       Within the past 12 months, did you worry that your food would run out before you got money to buy more?: No       Within the past 12 months, did the food you bought just not last and you didn t have money to get more?: No   Transportation Needs: Low Risk  (1/10/2024)     Transportation Needs       Within the past 12 months, has lack of transportation kept you from medical appointments, getting your medicines, non-medical meetings or appointments, work, or from getting things that you need?: No   Physical Activity: Not on file   Stress: Not on file   Social Connections: Not on file   Interpersonal Safety: Low Risk  (1/17/2024)     Interpersonal Safety       Do you feel physically and emotionally safe where you currently live?: Yes       Within the past  12 months, have you been hit, slapped, kicked or otherwise physically hurt by someone?: No       Within the past 12 months, have you been humiliated or emotionally abused in other ways by your partner or ex-partner?: No   Housing Stability: Low Risk  (1/10/2024)     Housing Stability       Do you have housing? : Yes       Are you worried about losing your housing?: No            Current Outpatient Prescriptions     Current Outpatient Medications   Medication Sig Dispense Refill     lamoTRIgine (LAMICTAL) 100 MG tablet Take 2.5 tablets (250 mg) by mouth daily No further refills until follow-up ernst established.  Last seen 9/2023 75 tablet 5     meloxicam (MOBIC) 15 MG tablet Take 1 tablet (15 mg) by mouth daily 30 tablet 0     methimazole (TAPAZOLE) 5 MG tablet Take 1 tablet (5 mg) by mouth daily. 90 tablet 0     tiZANidine (ZANAFLEX) 2 MG tablet Take 1 tablet (2 mg) by mouth 3 times daily 90 tablet 0              On examination, patient is alert and cooperative.  Vitals are stable he is afebrile.     HEENT is negative.  Extraocular movements are intact.  Face is symmetric.  Tongue is midline.  Neck is supple.  He is able to move his upper extremities functionally.  He is able to carry out straight leg raising test.  Examination of the hips were nontender.  Lincoln's positive bilaterally.  Sari's finger test and Gaenslen's were positive.  There is tenderness over the lumbar paraspinal region bilaterally, left side worse than the right.  Tenderness was elicited over the lumbosacral region over the sacroiliac joint as well.  The piriformis was nontender.    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 he has been given conservative treatment including medications, chiropractic without improvement.  At this point I am recommending MRI of the lumbar spine to exclude other possibilities and consider injecting the  sacroiliac joint and the L4-S1 facets potentially.  I am also going to give him some lidocaine patches to apply topically in the meanwhile while we are awaiting improvement with treatments.    30 minutes for the evaluation management portion of the visit, greater than 50% was for counseling above-mentioned issues.     Thank you much for allow me to assist in his care.     Aubrey Fulton MD       Again, thank you for allowing me to participate in the care of your patient.      Sincerely,    Aubrey Fulton MD

## 2024-10-29 NOTE — PROGRESS NOTES
CC: Chronic low back pain extending to the knee worse on the left side, here for follow-up.    Patient is a very pleasant 38-year-old gentleman who I saw on 8/8/2024.  At that time because of his back issues since July and noting tenderness over the quadratus lumborum on the left side, I gave him some trigger point injections.  I also started him on Mobic and tizanidine.  He has been taking them and does not find that they have been that helpful.  His pain has come back and as noted extends to his lower extremity on the left side up to the knee.     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  He has been having pain affecting the left lower back region that does not go below the knee.  He denies any numbness or tingling.  He had x-rays done on 7/31/2024 which showed no significant concerns.  He has been taking pain medications including Tylenol and Advil without significant improvement.  He has also had some chiropractic 2-3 times a week for the first few weeks again without improvement.  He would like to get some relief.     He does have history of hyperthyroidism and has been on methimazole.     Past Surgical History   No past surgical history on file.     No history of surgeries.    LUMBAR SPINE TWO TO THREE VIEWS  7/31/2024 8:12 AM      HISTORY: Lumbar pain.     COMPARISON: None.                                                                       IMPRESSION: Alignment of the lumbar spine is within normal limits. No  loss of vertebral body height. No significant degenerative endplate  changes or loss of intervertebral disc space.      GUTIERREZ VILLALBA MD         Family History            Problem (# of Occurrences) Relation (Name,Age of Onset)     Anxiety Disorder (1) Sister     Depression (2) Sister, Paternal Grandmother     Alcoholism (1) Paternal Grandfather                   Social History   Social History             Socioeconomic History    Marital status: Single       Spouse name: Not on file    Number of children: Not on file    Years of education: Not on file    Highest education level: Not on file   Occupational History    Not on file   Tobacco Use    Smoking status: Never    Smokeless tobacco: Never   Vaping Use    Vaping status: Never Used   Substance and Sexual Activity    Alcohol use: Not on file    Drug use: Not on file    Sexual activity: Not on file   Other Topics Concern    Not on file   Social History Narrative    Not on file      Social Determinants of Health           Financial Resource Strain: Low Risk  (1/10/2024)     Financial Resource Strain      Within the past 12 months, have you or your family members you live with been unable to get utilities (heat, electricity) when it was really needed?: No   Food Insecurity: Low Risk  (1/10/2024)     Food Insecurity      Within the past 12 months, did you worry that your food would run out before you got money to buy more?: No      Within the past 12 months, did the food you bought just not last and you didn t have money to get more?: No   Transportation Needs: Low Risk  (1/10/2024)     Transportation Needs      Within the past 12 months, has lack of transportation kept you from medical appointments, getting your medicines, non-medical meetings or appointments, work, or from getting things that you need?: No   Physical Activity: Not on file   Stress: Not on file   Social Connections: Not on file   Interpersonal Safety: Low Risk  (1/17/2024)     Interpersonal Safety      Do you feel physically and emotionally safe where you currently live?: Yes      Within the past 12 months, have you been hit, slapped, kicked or otherwise physically hurt by someone?: No      Within the past 12 months, have you been humiliated or emotionally abused in other ways by your partner or ex-partner?: No   Housing Stability: Low Risk  (1/10/2024)     Housing Stability      Do you have  housing? : Yes      Are you worried about losing your housing?: No            Current Outpatient Prescriptions     Current Outpatient Medications   Medication Sig Dispense Refill    lamoTRIgine (LAMICTAL) 100 MG tablet Take 2.5 tablets (250 mg) by mouth daily No further refills until follow-up ernst established.  Last seen 9/2023 75 tablet 5    meloxicam (MOBIC) 15 MG tablet Take 1 tablet (15 mg) by mouth daily 30 tablet 0    methimazole (TAPAZOLE) 5 MG tablet Take 1 tablet (5 mg) by mouth daily. 90 tablet 0    tiZANidine (ZANAFLEX) 2 MG tablet Take 1 tablet (2 mg) by mouth 3 times daily 90 tablet 0              On examination, patient is alert and cooperative.  Vitals are stable he is afebrile.     HEENT is negative.  Extraocular movements are intact.  Face is symmetric.  Tongue is midline.  Neck is supple.  He is able to move his upper extremities functionally.  He is able to carry out straight leg raising test.  Examination of the hips were nontender.  Lincoln's positive bilaterally.  Sari's finger test and Gaenslen's were positive.  There is tenderness over the lumbar paraspinal region bilaterally, left side worse than the right.  Tenderness was elicited over the lumbosacral region over the sacroiliac joint as well.  The piriformis was nontender.    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 he has been given conservative treatment including medications, chiropractic without improvement.  At this point I am recommending MRI of the lumbar spine to exclude other possibilities and consider injecting the sacroiliac joint and the L4-S1 facets potentially.  I am also going to give him some lidocaine patches to apply topically in the meanwhile while we are awaiting improvement with treatments.    30 minutes for the evaluation management portion of the visit, greater than 50% was for counseling above-mentioned  issues.     Thank you much for allow me to assist in his care.     Aubrey Fulton MD

## 2024-10-31 ASSESSMENT — ANXIETY QUESTIONNAIRES
3. WORRYING TOO MUCH ABOUT DIFFERENT THINGS: NEARLY EVERY DAY
IF YOU CHECKED OFF ANY PROBLEMS ON THIS QUESTIONNAIRE, HOW DIFFICULT HAVE THESE PROBLEMS MADE IT FOR YOU TO DO YOUR WORK, TAKE CARE OF THINGS AT HOME, OR GET ALONG WITH OTHER PEOPLE: EXTREMELY DIFFICULT
GAD7 TOTAL SCORE: 17
6. BECOMING EASILY ANNOYED OR IRRITABLE: NEARLY EVERY DAY
8. IF YOU CHECKED OFF ANY PROBLEMS, HOW DIFFICULT HAVE THESE MADE IT FOR YOU TO DO YOUR WORK, TAKE CARE OF THINGS AT HOME, OR GET ALONG WITH OTHER PEOPLE?: EXTREMELY DIFFICULT
GAD7 TOTAL SCORE: 17
4. TROUBLE RELAXING: NEARLY EVERY DAY
7. FEELING AFRAID AS IF SOMETHING AWFUL MIGHT HAPPEN: MORE THAN HALF THE DAYS
5. BEING SO RESTLESS THAT IT IS HARD TO SIT STILL: MORE THAN HALF THE DAYS
1. FEELING NERVOUS, ANXIOUS, OR ON EDGE: SEVERAL DAYS
7. FEELING AFRAID AS IF SOMETHING AWFUL MIGHT HAPPEN: MORE THAN HALF THE DAYS
GAD7 TOTAL SCORE: 17
2. NOT BEING ABLE TO STOP OR CONTROL WORRYING: NEARLY EVERY DAY

## 2024-11-04 ENCOUNTER — OFFICE VISIT (OUTPATIENT)
Dept: FAMILY MEDICINE | Facility: CLINIC | Age: 39
End: 2024-11-04
Payer: COMMERCIAL

## 2024-11-04 VITALS
DIASTOLIC BLOOD PRESSURE: 84 MMHG | RESPIRATION RATE: 14 BRPM | HEIGHT: 73 IN | HEART RATE: 64 BPM | TEMPERATURE: 98.1 F | OXYGEN SATURATION: 99 % | BODY MASS INDEX: 24.23 KG/M2 | WEIGHT: 182.8 LBS | SYSTOLIC BLOOD PRESSURE: 133 MMHG

## 2024-11-04 DIAGNOSIS — R45.851 PASSIVE SUICIDAL IDEATIONS: ICD-10-CM

## 2024-11-04 DIAGNOSIS — F33.2 SEVERE EPISODE OF RECURRENT MAJOR DEPRESSIVE DISORDER, WITHOUT PSYCHOTIC FEATURES (H): Primary | ICD-10-CM

## 2024-11-04 PROCEDURE — 99214 OFFICE O/P EST MOD 30 MIN: CPT | Performed by: FAMILY MEDICINE

## 2024-11-04 PROCEDURE — 96127 BRIEF EMOTIONAL/BEHAV ASSMT: CPT | Performed by: FAMILY MEDICINE

## 2024-11-04 RX ORDER — LAMOTRIGINE 100 MG/1
250 TABLET ORAL DAILY
Qty: 75 TABLET | Refills: 5 | Status: CANCELLED | OUTPATIENT
Start: 2024-11-04

## 2024-11-04 ASSESSMENT — PATIENT HEALTH QUESTIONNAIRE - PHQ9
SUM OF ALL RESPONSES TO PHQ QUESTIONS 1-9: 17
10. IF YOU CHECKED OFF ANY PROBLEMS, HOW DIFFICULT HAVE THESE PROBLEMS MADE IT FOR YOU TO DO YOUR WORK, TAKE CARE OF THINGS AT HOME, OR GET ALONG WITH OTHER PEOPLE: SOMEWHAT DIFFICULT
SUM OF ALL RESPONSES TO PHQ QUESTIONS 1-9: 17

## 2024-11-04 ASSESSMENT — PAIN SCALES - GENERAL: PAINLEVEL_OUTOF10: MODERATE PAIN (5)

## 2024-11-04 ASSESSMENT — ENCOUNTER SYMPTOMS: NERVOUS/ANXIOUS: 1

## 2024-11-04 ASSESSMENT — COLUMBIA-SUICIDE SEVERITY RATING SCALE - C-SSRS
1. WITHIN THE PAST MONTH, HAVE YOU WISHED YOU WERE DEAD OR WISHED YOU COULD GO TO SLEEP AND NOT WAKE UP?: NO
6. HAVE YOU EVER DONE ANYTHING, STARTED TO DO ANYTHING, OR PREPARED TO DO ANYTHING TO END YOUR LIFE?: NO
2. IN THE PAST MONTH, HAVE YOU ACTUALLY HAD ANY THOUGHTS OF KILLING YOURSELF?: NO

## 2024-11-04 NOTE — PROGRESS NOTES
Assessment & Plan     Severe episode of recurrent major depressive disorder, without psychotic features (H)  Passive suicidal ideations  -Pt continues to struggle with depression.  -[facility]   -On Lamictal for depression, states has enough refills until visit with PCP in Jan 2025.  -Pt wanting to start therapy, prefers in person visits. Therapy referral placed. Discussed that it may be difficult to get in for in-person visits initially. Patient can also inquire with insurance if outside mental health clinics are covered.  -Crisis resources provided.   - Adult Mental Health  Referral              Nicotine/Tobacco Cessation  He reports that he has been smoking other. He has never used smokeless tobacco.  Nicotine/Tobacco Cessation Plan  Not discussed      Depression Screening Follow Up        11/4/2024     7:57 AM   PHQ   PHQ-9 Total Score 17    Q9: Thoughts of better off dead/self-harm past 2 weeks Several days    F/U: Thoughts of suicide or self-harm No    F/U: Safety concerns No        Patient-reported                 11/4/2024     8:30 AM   C-SSRS (Brief [place])   Within the last month, have you wished you were dead or wished you could go to sleep and not wake up? No   Within the last month, have you had any actual thoughts of killing yourself? No   Within the last month, have you ever done anything, started to do anything, or prepared to do anything to end your life? No         Follow Up Actions Taken  Crisis resource information provided in the After Visit Summary  Mental Health Referral placed    Discussed the following ways the patient can remain in a safe environment:  be around others        Mika Chavez is a 38 year old, presenting for the following health issues:  Establish Care, Anxiety, and Depression      11/4/2024     8:07 AM   Additional Questions   Roomed by Janay RYDER     Anxiety    History of Present Illness       Mental  "Health Follow-up:  Patient presents to follow-up on Depression & Anxiety.Patient's depression since last visit has been:  No change  The patient is having other symptoms associated with depression.  Patient's anxiety since last visit has been:  No change  The patient is having other symptoms associated with anxiety.  Any significant life events: relationship concerns, job concerns and financial concerns  Patient is feeling anxious or having panic attacks.  Patient has concerns about alcohol or drug use.    He eats 2-3 servings of fruits and vegetables daily.He consumes 0 sweetened beverage(s) daily.He exercises with enough effort to increase his heart rate 9 or less minutes per day.  He exercises with enough effort to increase his heart rate 3 or less days per week.   He is taking medications regularly.     Has PCP, Dr. Spencer.   Hx of depression since he was 14 years old.  Moved to [place] about 1 year ago.  Was following with psychiatry through [facility], notes PCP okay with fill mental health meds.  Wants to get established with a therapist. Did try to go through [facility] many months ago but only had virtual visits, was told in person visits were booked out for months. Would like referral for therapy.  Reports passive suicidal ideations, no plan or intent to harm self.                    Objective    BP (!) 141/92   Pulse 64   Temp 98.1  F (36.7  C) (Temporal)   Resp 14   Ht 1.847 m (6' 0.72\")   Wt 82.9 kg (182 lb 12.8 oz)   SpO2 99%   BMI 24.31 kg/m    Body mass index is 24.31 kg/m .  Physical Exam   GENERAL: alert and no distress  PSYCH: mentation appears normal, anxious, judgement and insight intact, and appearance well groomed            Signed Electronically by: Yuniel Nieves DO    "

## 2024-11-10 DIAGNOSIS — F33.2 SEVERE EPISODE OF RECURRENT MAJOR DEPRESSIVE DISORDER, WITHOUT PSYCHOTIC FEATURES (H): ICD-10-CM

## 2024-11-11 RX ORDER — LAMOTRIGINE 100 MG/1
TABLET ORAL
Qty: 75 TABLET | Refills: 1 | Status: SHIPPED | OUTPATIENT
Start: 2024-11-11

## 2024-11-15 ENCOUNTER — HOSPITAL ENCOUNTER (OUTPATIENT)
Dept: MRI IMAGING | Facility: CLINIC | Age: 39
Discharge: HOME | End: 2024-11-15
Attending: PHYSICAL MEDICINE & REHABILITATION | Admitting: PHYSICAL MEDICINE & REHABILITATION
Payer: COMMERCIAL

## 2024-11-15 DIAGNOSIS — G89.29 OTHER CHRONIC PAIN: ICD-10-CM

## 2024-11-15 DIAGNOSIS — M53.3 DISORDER OF SACRUM: ICD-10-CM

## 2024-11-15 DIAGNOSIS — M47.817 LUMBOSACRAL SPONDYLOSIS WITHOUT MYELOPATHY: ICD-10-CM

## 2024-11-15 PROCEDURE — 72148 MRI LUMBAR SPINE W/O DYE: CPT

## 2024-11-18 ENCOUNTER — TELEPHONE (OUTPATIENT)
Dept: PHYSICAL MEDICINE AND REHAB | Facility: CLINIC | Age: 39
End: 2024-11-18
Payer: COMMERCIAL

## 2024-11-18 NOTE — TELEPHONE ENCOUNTER
Called patient and relayed provider message of his MRI Lumbar Spine results interpretation:    ----- Message from Aubrey Fulton sent at 11/15/2024  4:14 PM CST -----  IMPRESSION:  Multilevel degenerative changes most notably at L4-5  where there is a left central disc extrusion present with mild left  lateral recess narrowing. Please see above discussion for level  specific degenerative changes.      ESTHER VILLA MD     He does not have any radicular symptoms from not worried about the disc noted above.  Will plan injection therapy as discussed in the clinic if he is okay with it and I will write the orders.    Aubrey Fulton MD    Patient does not want to do this unless Dr. Fulton thinks this is absolutely necessary, as he states that getting to the [facility] location is too difficult for him to get to at this time. He states if the symptoms get a lot worse he may reach out in the future to Dr. Fulton to see if he can get that injection then if it progresses.     Routed as an FYI to Dr. Fulton.     Bernadine Boo, PETERSONN RN  RN Care Coordinator for Physical Medicine and Rehabilitation  [facility] - [address] [address]  Office: [phone]  Fax: [phone]

## 2024-12-19 ENCOUNTER — ANCILLARY PROCEDURE (OUTPATIENT)
Dept: GENERAL RADIOLOGY | Facility: CLINIC | Age: 39
End: 2024-12-19
Attending: PHYSICIAN ASSISTANT
Payer: COMMERCIAL

## 2024-12-19 ENCOUNTER — OFFICE VISIT (OUTPATIENT)
Dept: URGENT CARE | Facility: URGENT CARE | Age: 39
End: 2024-12-19
Payer: COMMERCIAL

## 2024-12-19 VITALS
HEART RATE: 65 BPM | RESPIRATION RATE: 16 BRPM | WEIGHT: 185 LBS | OXYGEN SATURATION: 99 % | SYSTOLIC BLOOD PRESSURE: 148 MMHG | TEMPERATURE: 97.3 F | DIASTOLIC BLOOD PRESSURE: 100 MMHG | BODY MASS INDEX: 24.6 KG/M2

## 2024-12-19 DIAGNOSIS — R22.31 LOCALIZED SWELLING ON RIGHT HAND: ICD-10-CM

## 2024-12-19 DIAGNOSIS — M79.644 PAIN OF FINGER OF RIGHT HAND: Primary | ICD-10-CM

## 2024-12-19 DIAGNOSIS — M79.644 PAIN OF FINGER OF RIGHT HAND: ICD-10-CM

## 2024-12-19 RX ORDER — IBUPROFEN 800 MG/1
800 TABLET, FILM COATED ORAL EVERY 8 HOURS PRN
Qty: 30 TABLET | Refills: 0 | Status: SHIPPED | OUTPATIENT
Start: 2024-12-19

## 2024-12-19 NOTE — PROGRESS NOTES
Assessment & Plan     Pain of finger of right hand    Xray hand  RICE treatment: Rest, Ice, compression, elevation   Motrin for right lateral hand swelling and tenderness  - XR Hand Right G/E 3 Views  - ibuprofen (ADVIL/MOTRIN) 800 MG tablet  Dispense: 30 tablet; Refill: 0    Localized swelling on right hand    RICE treatment: Rest, Ice, compression, elevation     Motrin for swelling and pain along right lateral pain  - XR Hand Right G/E 3 Views  - ibuprofen (ADVIL/MOTRIN) 800 MG tablet  Dispense: 30 tablet; Refill: 0         Results for orders placed or performed in visit on 12/19/24   XR Hand Right G/E 3 Views     Status: None    Narrative    Examination:  XR HAND RIGHT G/E 3 VIEWS    Date:  12/19/2024 12:51 PM     Clinical Information: Pain of finger of right hand; Localized swelling  on right hand    Comparison: none.      Impression    Impression:    1.  Negative right hand pain and limited spacing. No fracture,  erosion, or bone lesion.    JACOB HOLMAN MD         SYSTEM ID:  [id number]         No follow-ups on file.    Caleb Pablo [facility], PA-C  [facility] DMITRY Chavez is a 39 year old male who presents to clinic today for the following health issues:  Chief Complaint   Patient presents with    Urgent Care     Pt fell yesterday and injured the right hand.       HPI  Review of Systems  Constitutional, HEENT, cardiovascular, pulmonary, gi and gu systems are negative, except as otherwise noted.      Objective    BP (!) 148/100   Pulse 65   Temp 97.3  F (36.3  C) (Tympanic)   Resp 16   Wt 83.9 kg (185 lb)   SpO2 99%   BMI 24.60 kg/m    Physical Exam   GENERAL: alert and no distress  MS: pos for right lateral hand swelling, pain   SKIN: pos for swelling, localzied  NEURO: Normal strength and tone, mentation intact and speech normal  PSYCH: mentation appears normal, affect normal/bright

## 2025-01-09 DIAGNOSIS — F33.2 SEVERE EPISODE OF RECURRENT MAJOR DEPRESSIVE DISORDER, WITHOUT PSYCHOTIC FEATURES (H): ICD-10-CM

## 2025-01-09 RX ORDER — LAMOTRIGINE 100 MG/1
TABLET ORAL
Qty: 75 TABLET | Refills: 0 | Status: SHIPPED | OUTPATIENT
Start: 2025-01-09

## 2025-04-16 DIAGNOSIS — E05.90 HYPERTHYROIDISM: ICD-10-CM

## 2025-04-16 RX ORDER — METHIMAZOLE 5 MG/1
5 TABLET ORAL DAILY
Qty: 90 TABLET | Refills: 0 | Status: SHIPPED | OUTPATIENT
Start: 2025-04-16

## 2025-07-16 ENCOUNTER — MYC MEDICAL ADVICE (OUTPATIENT)
Dept: INTERNAL MEDICINE | Facility: CLINIC | Age: 40
End: 2025-07-16

## 2025-07-16 NOTE — TELEPHONE ENCOUNTER
Says was seen in [facility] and prescribed ointment for poison ivy. [facility] denies him any further care without being seen again. He feels what he was given he wasn't effectively treated. He is very frustrated with this.     Advised since  has not seen him for this issue and medication prescribed by a provider outside the system at minimum he would need to start an e-visit or be seen. He doesn't want to pay again. He will start an e-visit. I have sent him instruction on how to do this and we discussed how it would work.     Asked him to attach pictures if he can of his skin and Rx treatment he has used to date from [facility].     Sheryl Ward R.N.

## 2025-07-23 DIAGNOSIS — E05.90 HYPERTHYROIDISM: ICD-10-CM

## 2025-07-23 RX ORDER — METHIMAZOLE 5 MG/1
5 TABLET ORAL DAILY
Qty: 90 TABLET | Refills: 1 | Status: SHIPPED | OUTPATIENT
Start: 2025-07-23

## 2025-08-13 DIAGNOSIS — F33.2 SEVERE EPISODE OF RECURRENT MAJOR DEPRESSIVE DISORDER, WITHOUT PSYCHOTIC FEATURES (H): ICD-10-CM

## 2025-08-13 RX ORDER — LAMOTRIGINE 100 MG/1
TABLET ORAL
Qty: 270 TABLET | Refills: 1 | Status: SHIPPED | OUTPATIENT
Start: 2025-08-13

## (undated) RX ORDER — BETAMETHASONE SODIUM PHOSPHATE AND BETAMETHASONE ACETATE 3; 3 MG/ML; MG/ML
INJECTION, SUSPENSION INTRA-ARTICULAR; INTRALESIONAL; INTRAMUSCULAR; SOFT TISSUE
Status: DISPENSED
Start: 2024-08-08

## (undated) RX ORDER — BUPIVACAINE HYDROCHLORIDE 5 MG/ML
INJECTION, SOLUTION EPIDURAL; INTRACAUDAL
Status: DISPENSED
Start: 2024-08-08